# Patient Record
Sex: FEMALE | URBAN - METROPOLITAN AREA
[De-identification: names, ages, dates, MRNs, and addresses within clinical notes are randomized per-mention and may not be internally consistent; named-entity substitution may affect disease eponyms.]

---

## 2020-08-20 ENCOUNTER — TELEPHONE (OUTPATIENT)
Dept: OBSTETRICS AND GYNECOLOGY | Facility: CLINIC | Age: 40
End: 2020-08-20

## 2020-08-20 ENCOUNTER — OFFICE VISIT (OUTPATIENT)
Dept: OBSTETRICS AND GYNECOLOGY | Facility: CLINIC | Age: 40
End: 2020-08-20
Payer: MEDICAID

## 2020-08-20 VITALS
SYSTOLIC BLOOD PRESSURE: 120 MMHG | DIASTOLIC BLOOD PRESSURE: 92 MMHG | BODY MASS INDEX: 35.29 KG/M2 | WEIGHT: 224.88 LBS | HEIGHT: 67 IN

## 2020-08-20 DIAGNOSIS — Z01.419 WELL WOMAN EXAM WITH ROUTINE GYNECOLOGICAL EXAM: Primary | ICD-10-CM

## 2020-08-20 DIAGNOSIS — B35.4 TINEA CORPORIS: ICD-10-CM

## 2020-08-20 DIAGNOSIS — N89.8 VAGINAL DISCHARGE: ICD-10-CM

## 2020-08-20 PROCEDURE — 99385 PR PREVENTIVE VISIT,NEW,18-39: ICD-10-PCS | Mod: S$PBB,,, | Performed by: OBSTETRICS & GYNECOLOGY

## 2020-08-20 PROCEDURE — 99385 PREV VISIT NEW AGE 18-39: CPT | Mod: S$PBB,,, | Performed by: OBSTETRICS & GYNECOLOGY

## 2020-08-20 PROCEDURE — 88141 CYTOPATH C/V INTERPRET: CPT | Mod: ,,, | Performed by: PATHOLOGY

## 2020-08-20 PROCEDURE — 88141 PR  CYTOPATH CERV/VAG INTERPRET: ICD-10-PCS | Mod: ,,, | Performed by: PATHOLOGY

## 2020-08-20 PROCEDURE — 87480 CANDIDA DNA DIR PROBE: CPT

## 2020-08-20 PROCEDURE — 88142 CYTOPATH C/V THIN LAYER: CPT | Performed by: PATHOLOGY

## 2020-08-20 PROCEDURE — 87491 CHLMYD TRACH DNA AMP PROBE: CPT

## 2020-08-20 PROCEDURE — 99999 PR PBB SHADOW E&M-NEW PATIENT-LVL III: ICD-10-PCS | Mod: PBBFAC,,, | Performed by: OBSTETRICS & GYNECOLOGY

## 2020-08-20 PROCEDURE — 99999 PR PBB SHADOW E&M-NEW PATIENT-LVL III: CPT | Mod: PBBFAC,,, | Performed by: OBSTETRICS & GYNECOLOGY

## 2020-08-20 PROCEDURE — 99203 OFFICE O/P NEW LOW 30 MIN: CPT | Mod: PBBFAC | Performed by: OBSTETRICS & GYNECOLOGY

## 2020-08-20 PROCEDURE — 87510 GARDNER VAG DNA DIR PROBE: CPT

## 2020-08-20 PROCEDURE — 87624 HPV HI-RISK TYP POOLED RSLT: CPT

## 2020-08-20 RX ORDER — CETIRIZINE HYDROCHLORIDE 10 MG/1
TABLET ORAL
COMMUNITY
Start: 2020-07-27

## 2020-08-20 RX ORDER — NYSTATIN AND TRIAMCINOLONE ACETONIDE 100000; 1 [USP'U]/G; MG/G
CREAM TOPICAL
Qty: 30 G | Refills: 1 | Status: SHIPPED | OUTPATIENT
Start: 2020-08-20 | End: 2021-08-20

## 2020-08-20 RX ORDER — TRIAMCINOLONE ACETONIDE 0.25 MG/G
OINTMENT TOPICAL
COMMUNITY
Start: 2020-07-27 | End: 2021-07-27

## 2020-08-20 NOTE — PATIENT INSTRUCTIONS
Prevention Guidelines, Women Ages 18 to 39  Screening tests and vaccines are an important part of managing your health. Health counseling is essential, too. Below are guidelines for these, for women ages 18 to 39. Talk with your healthcare provider to make sure youre up-to-date on what you need.  Screening Who needs it How often   Alcohol misuse All women in this age group At routine exams   Blood pressure All women in this age group Every 2 years if your blood pressure is less than 120/80 mm Hg; yearly if your systolic blood pressure is 120 to 139 mm Hg, or your diastolic blood pressure reading is 80 to 89 mm Hg   Breast cancer All women in this age group should talk with their healthcare providers about the need for clinical breast exams (CBE)1 Clinical breast exam every 3 years1   Cervical cancer Women ages 21 and older Women between ages 21 and 29 should have a Pap test every 3 years; women between ages 30 and 65 are advised to have a Pap test plus an HPV test every 5 years   Chlamydia Sexually active women ages 24 and younger, and women at increased risk for infection Every 3 years if you're at risk or have symptoms   Depression All women in this age group At routine exams   Diabetes mellitus, type 2 Adults with no symptoms who are overweight or obese and have 1 or more other risk factors for diabetes At least every 3 years   Gonorrhea Sexually active women at increased risk for infection At routine exams   Hepatitis C Anyone at increased risk At routine exams   HIV All women At routine exams   Obesity All women in this age group At routine exams   Syphilis Women at increased risk for infection - talk with your healthcare provider At routine exams   Tuberculosis Women at increased risk for infection - talk with your healthcare provider Ask your healthcare provider   Vision All women in this age group At least 1 complete exam in your 20s, and 2 in your 30s   Vaccine2 Who needs it How often   Chickenpox  (varicella) All women in this age group who have no record of this infection or vaccine 2 doses; the second dose should be given 4 to 8 weeks after the first dose   Hepatitis A Women at increased risk for infection - talk with your healthcare provider 2 doses given at least 6 months apart   Hepatitis B Women at increased risk for infection - talk with your healthcare provider 3 doses over 6 months; second dose should be given 1 month after the first dose; the third dose should be given at least 2 months after the second dose and at least 4 months after the first dose   Haemophilus influenzae Type B (HIB) Women at increased risk for infection - talk with your healthcare provider 1 to 3 doses   Human papillomavirus (HPV) All women in this age group up to age 26 3 doses; the second dose should be given 1 to 2 months after the first dose and the third dose given 6 months after the first dose   Influenza (flu) All women in this age group Once a year   Measles, mumps, rubella (MMR) All women in this age group who have no record of these infections or vaccines 1 or 2 doses   Meningococcal Women at increased risk for infection - talk with your healthcare provider 1 or more doses   Pneumococcal conjugate vaccine (PCV13) and pneumococcal polysaccharide vaccine (PPSV23) Women at increased risk for infection - talk with your healthcare provider PCV13: 1 dose ages 19 to 65 (protects against 13 types of pneumococcal bacteria)  PPSV23: 1 to 2 doses through age 64, or 1 dose at 65 or older (protects against 23 types of pneumococcal bacteria)      Tetanus/diphtheria/pertussis (Td/Tdap) booster All women in this age group Td every 10 years, or a one-time dose of Tdap instead of a Td booster after age 18, then Td every 10 years   Counseling Who needs it How often   BRCA gene mutation testing for breast and ovarian cancer susceptibility Women with increased risk for having gene mutation When your risk is known   Breast cancer and  chemoprevention Women at high risk for breast cancer When your risk is known   Diet and exercise Women who are overweight or obese When diagnosed, and then at routine exams   Domestic violence Women at the age in which they are able to have children At routine exams   Sexually transmitted infection prevention Women who are sexually active At routine exams   Skin cancer Prevention of skin cancer in fair-skinned adults through age 24 At routine exams   Use of tobacco and the health effects it can cause All women in this age group Every visit   1According to the ACS, women ages 20 to 39 years should have a clinical breast exam (CBE) as part of their routine health exam every 3 years. Breast self-exams are an option for women starting in their 20s. But the  USPSTF does not recommend CBE.  2Those who are 18 years old and not up-to-date on their childhood vaccines should get all appropriate catch-up vaccines recommended by the CDC.  Date Last Reviewed: 8/27/2015  © 4011-4977 [employer]. [address] [place] [address], [place] [address]. All rights reserved. This information is not intended as a substitute for professional medical care. Always follow your healthcare professional's instructions.
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
## 2020-08-20 NOTE — TELEPHONE ENCOUNTER
----- Message from Jesi Cosme sent at 8/20/2020 10:42 AM CDT -----  Regarding: medication  Type: Patient Call Back    Who called:Self    What is the request in detail:Patient called regarding cream for ring worm, she wants to know if prescription was submitted and if so where was it sent.    Can the clinic reply by [facility]?no    Would the patient rather a call back or a response via [facility]? Callback    Best call back number:[phone]

## 2020-08-20 NOTE — TELEPHONE ENCOUNTER
Spoke with patient and explained to patient the medication Dr. Ferrer prescribed for her ringworm has been sent to the pharmacy. Patient denies any additional questions at this time.

## 2020-08-22 LAB
CANDIDA RRNA VAG QL PROBE: NOT DETECTED
G VAGINALIS RRNA GENITAL QL PROBE: DETECTED
T VAGINALIS RRNA GENITAL QL PROBE: NOT DETECTED

## 2020-08-23 DIAGNOSIS — B96.89 BV (BACTERIAL VAGINOSIS): Primary | ICD-10-CM

## 2020-08-23 DIAGNOSIS — N76.0 BV (BACTERIAL VAGINOSIS): Primary | ICD-10-CM

## 2020-08-23 RX ORDER — METRONIDAZOLE 500 MG/1
500 TABLET ORAL EVERY 12 HOURS
Qty: 14 TABLET | Refills: 0 | Status: SHIPPED | OUTPATIENT
Start: 2020-08-23 | End: 2020-08-30

## 2020-08-27 LAB
HPV HR 12 DNA SPEC QL NAA+PROBE: NEGATIVE
HPV16 AG SPEC QL: NEGATIVE
HPV18 DNA SPEC QL NAA+PROBE: NEGATIVE

## 2020-09-08 LAB
FINAL PATHOLOGIC DIAGNOSIS: ABNORMAL
Lab: ABNORMAL

## 2020-09-12 LAB
C TRACH DNA SPEC QL NAA+PROBE: NOT DETECTED
N GONORRHOEA DNA SPEC QL NAA+PROBE: NOT DETECTED

## 2021-04-12 ENCOUNTER — PATIENT MESSAGE (OUTPATIENT)
Dept: RESEARCH | Facility: HOSPITAL | Age: 41
End: 2021-04-12

## 2022-04-10 ENCOUNTER — HOSPITAL ENCOUNTER (EMERGENCY)
Facility: HOSPITAL | Age: 42
Discharge: HOME | End: 2022-04-10
Attending: INTERNAL MEDICINE
Payer: MEDICAID

## 2022-04-10 VITALS
WEIGHT: 224 LBS | BODY MASS INDEX: 35.08 KG/M2 | DIASTOLIC BLOOD PRESSURE: 67 MMHG | TEMPERATURE: 99 F | RESPIRATION RATE: 18 BRPM | OXYGEN SATURATION: 98 % | SYSTOLIC BLOOD PRESSURE: 116 MMHG | HEART RATE: 73 BPM

## 2022-04-10 DIAGNOSIS — R25.2 MUSCLE CRAMPS: ICD-10-CM

## 2022-04-10 DIAGNOSIS — E87.6 HYPOKALEMIA: Primary | ICD-10-CM

## 2022-04-10 LAB
ALBUMIN SERPL BCP-MCNC: 3.5 G/DL (ref 3.5–5.2)
ALP SERPL-CCNC: 52 U/L (ref 55–135)
ALT SERPL W/O P-5'-P-CCNC: 14 U/L (ref 10–44)
AMPHET+METHAMPHET UR QL: NEGATIVE
ANION GAP SERPL CALC-SCNC: 9 MMOL/L (ref 8–16)
AST SERPL-CCNC: 19 U/L (ref 10–40)
BARBITURATES UR QL SCN>200 NG/ML: NEGATIVE
BASOPHILS # BLD AUTO: 0.07 K/UL (ref 0–0.2)
BASOPHILS NFR BLD: 0.7 % (ref 0–1.9)
BENZODIAZ UR QL SCN>200 NG/ML: NEGATIVE
BILIRUB SERPL-MCNC: 0.7 MG/DL (ref 0.1–1)
BUN SERPL-MCNC: 13 MG/DL (ref 6–20)
BZE UR QL SCN: NEGATIVE
CALCIUM SERPL-MCNC: 8.4 MG/DL (ref 8.7–10.5)
CANNABINOIDS UR QL SCN: NEGATIVE
CHLORIDE SERPL-SCNC: 109 MMOL/L (ref 95–110)
CO2 SERPL-SCNC: 23 MMOL/L (ref 23–29)
CREAT SERPL-MCNC: 0.8 MG/DL (ref 0.5–1.4)
CREAT UR-MCNC: 83.6 MG/DL (ref 15–325)
DIFFERENTIAL METHOD: ABNORMAL
EOSINOPHIL # BLD AUTO: 0.3 K/UL (ref 0–0.5)
EOSINOPHIL NFR BLD: 3.2 % (ref 0–8)
ERYTHROCYTE [DISTWIDTH] IN BLOOD BY AUTOMATED COUNT: 12.6 % (ref 11.5–14.5)
EST. GFR  (AFRICAN AMERICAN): >60 ML/MIN/1.73 M^2
EST. GFR  (NON AFRICAN AMERICAN): >60 ML/MIN/1.73 M^2
GLUCOSE SERPL-MCNC: 110 MG/DL (ref 70–110)
HCT VFR BLD AUTO: 35.7 % (ref 37–48.5)
HGB BLD-MCNC: 11.3 G/DL (ref 12–16)
IMM GRANULOCYTES # BLD AUTO: 0.02 K/UL (ref 0–0.04)
IMM GRANULOCYTES NFR BLD AUTO: 0.2 % (ref 0–0.5)
LYMPHOCYTES # BLD AUTO: 3.6 K/UL (ref 1–4.8)
LYMPHOCYTES NFR BLD: 35.3 % (ref 18–48)
MCH RBC QN AUTO: 29.3 PG (ref 27–31)
MCHC RBC AUTO-ENTMCNC: 31.7 G/DL (ref 32–36)
MCV RBC AUTO: 93 FL (ref 82–98)
METHADONE UR QL SCN>300 NG/ML: NEGATIVE
MONOCYTES # BLD AUTO: 0.6 K/UL (ref 0.3–1)
MONOCYTES NFR BLD: 5.9 % (ref 4–15)
NEUTROPHILS # BLD AUTO: 5.6 K/UL (ref 1.8–7.7)
NEUTROPHILS NFR BLD: 54.7 % (ref 38–73)
NRBC BLD-RTO: 0 /100 WBC
OPIATES UR QL SCN: NEGATIVE
PCP UR QL SCN>25 NG/ML: NEGATIVE
PLATELET # BLD AUTO: 304 K/UL (ref 150–450)
PMV BLD AUTO: 9.3 FL (ref 9.2–12.9)
POTASSIUM SERPL-SCNC: 2.9 MMOL/L (ref 3.5–5.1)
PROT SERPL-MCNC: 6.5 G/DL (ref 6–8.4)
RBC # BLD AUTO: 3.86 M/UL (ref 4–5.4)
SODIUM SERPL-SCNC: 141 MMOL/L (ref 136–145)
TOXICOLOGY INFORMATION: NORMAL
WBC # BLD AUTO: 10.15 K/UL (ref 3.9–12.7)

## 2022-04-10 PROCEDURE — 80053 COMPREHEN METABOLIC PANEL: CPT | Performed by: INTERNAL MEDICINE

## 2022-04-10 PROCEDURE — 99284 EMERGENCY DEPT VISIT MOD MDM: CPT | Mod: 25

## 2022-04-10 PROCEDURE — 96361 HYDRATE IV INFUSION ADD-ON: CPT

## 2022-04-10 PROCEDURE — 80307 DRUG TEST PRSMV CHEM ANLYZR: CPT | Performed by: INTERNAL MEDICINE

## 2022-04-10 PROCEDURE — 25000003 PHARM REV CODE 250: Performed by: INTERNAL MEDICINE

## 2022-04-10 PROCEDURE — 85025 COMPLETE CBC W/AUTO DIFF WBC: CPT | Performed by: INTERNAL MEDICINE

## 2022-04-10 PROCEDURE — 96360 HYDRATION IV INFUSION INIT: CPT

## 2022-04-10 RX ORDER — SODIUM CHLORIDE 9 MG/ML
1000 INJECTION, SOLUTION INTRAVENOUS ONCE
Status: COMPLETED | OUTPATIENT
Start: 2022-04-10 | End: 2022-04-10

## 2022-04-10 RX ORDER — POTASSIUM CHLORIDE 20 MEQ/1
60 TABLET, EXTENDED RELEASE ORAL
Status: COMPLETED | OUTPATIENT
Start: 2022-04-10 | End: 2022-04-10

## 2022-04-10 RX ORDER — SODIUM CHLORIDE 9 MG/ML
1000 INJECTION, SOLUTION INTRAVENOUS ONCE
Status: DISCONTINUED | OUTPATIENT
Start: 2022-04-10 | End: 2022-04-10

## 2022-04-10 RX ADMIN — SODIUM CHLORIDE 1000 ML: 0.9 INJECTION, SOLUTION INTRAVENOUS at 03:04

## 2022-04-10 RX ADMIN — POTASSIUM CHLORIDE 60 MEQ: 20 TABLET, EXTENDED RELEASE ORAL at 02:04

## 2022-04-10 RX ADMIN — SODIUM CHLORIDE 1000 ML: 0.9 INJECTION, SOLUTION INTRAVENOUS at 02:04

## 2022-04-10 NOTE — ED PROVIDER NOTES
Encounter Date: 4/10/2022    SCRIBE #1 NOTE: I, Alex Kerr, am scribing for, and in the presence of,  Reji Shea MD. I have scribed the following portions of the note - Other sections scribed: HPI, ROS, PE.       History     Chief Complaint   Patient presents with    Seizures     Possible seizure according to family. No hx of seizure, not postictal. EMS reports bizzarre behavior on scene. Follows commands. Pt oriented x4, but will only intermittently respond and intermittently follow commands. Pt often stares at the wall and does not respond. VSS. NAD noted.      Flakita Douglas is a 41 y. o female with no pertinent PMHx, that comes to the ED complaining of possible seizure beginning today. Per patient's , she went to  his daughter when she said that she needed to go to the hospital, endorsed nausea, and subsequently stopped talking. EMS personnel reports bizarre behavior on scene. Per patient's , patient didn't drink any water today and endorses having one energy drink. No medications taken PTA. No alleviating or exacerbating factors noted. Denies CP. Allergic to penicillin.  Patient states she feels as though all her muscles are  tight, including her facial muscles.    The history is provided by the patient, the spouse and the EMS personnel. No  was used.     Review of patient's allergies indicates:   Allergen Reactions    Pcn [penicillins]      History reviewed. No pertinent past medical history.  History reviewed. No pertinent surgical history.  History reviewed. No pertinent family history.  Social History     Tobacco Use    Smoking status: Never Smoker   Substance Use Topics    Alcohol use: Yes     Alcohol/week: 0.0 standard drinks    Drug use: Never     Review of Systems   Constitutional: Negative for fever.   HENT: Negative for congestion.    Eyes: Negative for visual disturbance.   Respiratory: Negative for cough.    Cardiovascular: Negative for  chest pain.   Gastrointestinal: Positive for nausea.   Genitourinary: Negative for dysuria.   Musculoskeletal: Positive for myalgias. Negative for back pain.   Skin: Negative for rash.   Neurological: Negative for weakness.   All other systems reviewed and are negative.      Physical Exam     Initial Vitals [04/10/22 0004]   BP Pulse Resp Temp SpO2   (!) 160/90 84 16 98.4 °F (36.9 °C) 100 %      MAP       --         Physical Exam    Nursing note and vitals reviewed.  Constitutional: She appears well-developed and well-nourished.   HENT:   Head: Normocephalic and atraumatic.   Right Ear: External ear normal.   Left Ear: External ear normal.   Mouth/Throat: Oropharynx is clear and moist.   Eyes: Conjunctivae and EOM are normal. Pupils are equal, round, and reactive to light.   Neck: Neck supple.   Normal range of motion.  Cardiovascular: Normal rate and regular rhythm.   Pulmonary/Chest: Breath sounds normal. No respiratory distress. She has no wheezes. She has no rales.   Abdominal: Abdomen is soft. Bowel sounds are normal. There is no abdominal tenderness.   Musculoskeletal:         General: No tenderness or edema.      Cervical back: Normal range of motion and neck supple.      Comments: Patient is able move all extremities, but states all movement is painful secondary to generalized muscle cramps.     Neurological: She is alert and oriented to person, place, and time. She has normal strength.   Skin: Skin is warm. Capillary refill takes less than 2 seconds.   Psychiatric: She has a normal mood and affect. Thought content normal.         ED Course   Procedures  Labs Reviewed   CBC W/ AUTO DIFFERENTIAL - Abnormal; Notable for the following components:       Result Value    RBC 3.86 (*)     Hemoglobin 11.3 (*)     Hematocrit 35.7 (*)     MCHC 31.7 (*)     All other components within normal limits   COMPREHENSIVE METABOLIC PANEL - Abnormal; Notable for the following components:    Potassium 2.9 (*)     Calcium 8.4 (*)      Alkaline Phosphatase 52 (*)     All other components within normal limits   DRUG SCREEN PANEL, URINE EMERGENCY    Narrative:     Specimen Source->Urine          Imaging Results          CT Head Without Contrast (Final result)  Result time 04/10/22 00:45:21    Final result by Jose Paz MD (04/10/22 00:45:21)                 Impression:      No acute intracranial abnormalities identified.      Electronically signed by: Jose Paz MD  Date:    04/10/2022  Time:    00:45             Narrative:    EXAMINATION:  CT HEAD WITHOUT CONTRAST    CLINICAL HISTORY:  Seizure, new-onset, no history of trauma;    TECHNIQUE:  Low dose axial images were obtained through the head.  Coronal and sagittal reformations were also performed. Contrast was not administered.    COMPARISON:  None.    FINDINGS:  No evidence of acute/recent major vascular distribution cerebral infarction, intraparenchymal hemorrhage, or intra-axial space occupying lesion. The ventricular system is normal in size and configuration with no evidence of hydrocephalus. No effacement of the skull-base cisterns. No abnormal extra-axial fluid collections or blood products. Visualized paranasal sinuses and mastoid air cells are clear. The calvarium shows no significant abnormality.                                 Medications   0.9%  NaCl infusion (has no administration in time range)   potassium chloride SA CR tablet 60 mEq (60 mEq Oral Given 4/10/22 0217)   0.9%  NaCl infusion (1,000 mLs Intravenous New Bag 4/10/22 0217)     Medical Decision Making:   History:   Old Medical Records: I decided to obtain old medical records.  Initial Assessment:   Flakita Douglas is a 41 y. o female with no pertinent PMHx, that comes to the ED complaining of possible seizure beginning today. Per patient's , she went to  his daughter when she said that she needed to go to the hospital, endorsed nausea, and subsequently stopped talking. EMS personnel reports  "bizarre behavior on scene. Per patient's , patient didn't drink any water today and endorses having one energy drink. No medications taken PTA. No alleviating or exacerbating factors noted. Denies CP. Allergic to penicillin.  Patient states she feels as though all her muscles are " tight", including her facial muscles.    Clinical Tests:   Lab Tests: Ordered and Reviewed  Radiological Study: Ordered and Reviewed  ED Management:  CBC/CMP revealed slight anemia as well as hypokalemia (potassium was 2.9).  IV fluids and supplemental potassium were given.  Patient states symptoms are completely resolved after normal saline bolus and potassium.  She was able to ambulate without difficulty prior to discharge.  Instructions for hypokalemia were given and patient was advised to follow-up with her primary care physician within the next 3 days for re-evaluation/return to the emergency department if condition worsens.          Scribe Attestation:   Scribe #1: I performed the above scribed service and the documentation accurately describes the services I performed. I attest to the accuracy of the note.                 Clinical Impression:   Final diagnoses:  [E87.6] Hypokalemia (Primary)  [R25.2] Muscle cramps       This document was produced by a scribe under my direction and in my presence. I agree with the content of the note and have made any necessary edits.     Dr. Shea    04/10/2022 3:58 AM     ED Disposition Condition    Discharge Stable        ED Prescriptions     None        Follow-up Information    None          Reji Shea MD  04/10/22 6050    "

## 2024-08-09 ENCOUNTER — TELEPHONE (OUTPATIENT)
Dept: OBSTETRICS AND GYNECOLOGY | Facility: CLINIC | Age: 44
End: 2024-08-09
Payer: MEDICAID

## 2024-08-21 ENCOUNTER — TELEPHONE (OUTPATIENT)
Dept: OBSTETRICS AND GYNECOLOGY | Facility: CLINIC | Age: 44
End: 2024-08-21
Payer: COMMERCIAL

## 2024-08-21 NOTE — TELEPHONE ENCOUNTER
Pt scheduled.  ----- Message from Dinora Asif sent at 8/21/2024  3:57 PM CDT -----  .Type: Patient Call Back    Who called: Self     What is the request in detail: Need to reschedule appointments for an earlier date and time. Ask that the nurse give nayan a call     Can the clinic reply by [facility]? No     Would the patient rather a call back or a response via [facility]? Call Back     Best call back number: .[phone] (home) [phone] (work)      Additional Information:

## 2024-09-10 ENCOUNTER — OFFICE VISIT (OUTPATIENT)
Dept: OBSTETRICS AND GYNECOLOGY | Facility: CLINIC | Age: 44
End: 2024-09-10
Payer: COMMERCIAL

## 2024-09-10 VITALS — DIASTOLIC BLOOD PRESSURE: 70 MMHG | BODY MASS INDEX: 31.17 KG/M2 | WEIGHT: 199 LBS | SYSTOLIC BLOOD PRESSURE: 110 MMHG

## 2024-09-10 DIAGNOSIS — Z30.432 ENCOUNTER FOR IUD REMOVAL: ICD-10-CM

## 2024-09-10 DIAGNOSIS — Z12.31 BREAST CANCER SCREENING BY MAMMOGRAM: Primary | ICD-10-CM

## 2024-09-10 DIAGNOSIS — Z30.09 UNWANTED FERTILITY: ICD-10-CM

## 2024-09-10 PROCEDURE — 3008F BODY MASS INDEX DOCD: CPT | Mod: CPTII,S$GLB,, | Performed by: OBSTETRICS & GYNECOLOGY

## 2024-09-10 PROCEDURE — 1159F MED LIST DOCD IN RCRD: CPT | Mod: CPTII,S$GLB,, | Performed by: OBSTETRICS & GYNECOLOGY

## 2024-09-10 PROCEDURE — 99999 PR PBB SHADOW E&M-EST. PATIENT-LVL IV: CPT | Mod: PBBFAC,,, | Performed by: OBSTETRICS & GYNECOLOGY

## 2024-09-10 PROCEDURE — 3078F DIAST BP <80 MM HG: CPT | Mod: CPTII,S$GLB,, | Performed by: OBSTETRICS & GYNECOLOGY

## 2024-09-10 PROCEDURE — 3074F SYST BP LT 130 MM HG: CPT | Mod: CPTII,S$GLB,, | Performed by: OBSTETRICS & GYNECOLOGY

## 2024-09-10 PROCEDURE — 1160F RVW MEDS BY RX/DR IN RCRD: CPT | Mod: CPTII,S$GLB,, | Performed by: OBSTETRICS & GYNECOLOGY

## 2024-09-10 PROCEDURE — 99214 OFFICE O/P EST MOD 30 MIN: CPT | Mod: S$GLB,,, | Performed by: OBSTETRICS & GYNECOLOGY

## 2024-09-10 NOTE — PROGRESS NOTES
History & Physical  Gynecology Problem Visit      SUBJECTIVE:     Chief Complaint: Gynecologic Exam       History of Present Illness:  Ms. Douglas is a 43 y/o female who presents for annual exam. She reports that she has IUD in place that she would like to have her IUD removed. Patient reports that she would like to have a tubal ligation. She reports that she would like to wait until she has salpingectomy done before she removes the IUD.      Review of patient's allergies indicates:   Allergen Reactions    Pcn [penicillins]        History reviewed. No pertinent past medical history.  History reviewed. No pertinent surgical history.  OB History          2    Para   2    Term   2            AB        Living   2         SAB        IAB        Ectopic        Multiple        Live Births                   Family History   Problem Relation Name Age of Onset    Breast cancer Maternal Aunt      Breast cancer Maternal Grandmother       Social History     Tobacco Use    Smoking status: Never   Substance Use Topics    Alcohol use: Yes     Alcohol/week: 0.0 standard drinks of alcohol    Drug use: Never       Current Outpatient Medications   Medication Sig    cetirizine (ZYRTEC) 10 MG tablet TK 1 T PO D    multivitamin capsule Take 1 capsule by mouth.    nystatin-triamcinolone (MYCOLOG II) cream Apply to affected area 2 times daily    triamcinolone acetonide 0.025% (KENALOG) 0.025 % Oint Apply to the affected area 1-2 times daily for up to 2 weeks.     No current facility-administered medications for this visit.     Review of Systems:  Review of Systems   Constitutional:  Negative for chills and fatigue.   Respiratory:  Negative for chest tightness and shortness of breath.    Cardiovascular:  Negative for chest pain.   Gastrointestinal:  Negative for abdominal pain, nausea and vomiting.   Genitourinary:  Negative for frequency, menstrual problem, pelvic pain, vaginal bleeding and vaginal discharge.   Musculoskeletal:   Negative for back pain.   Neurological:  Negative for dizziness and light-headedness.   Psychiatric/Behavioral:  Negative for confusion and sleep disturbance. The patient is not nervous/anxious.         OBJECTIVE:   Vitals:     Vitals:    09/10/24 0853   BP: 110/70   Weight: 90.3 kg (199 lb)        Physical Exam:  Physical Exam  Vitals and nursing note reviewed.   Constitutional:       Appearance: Normal appearance. She is well-developed.   Cardiovascular:      Rate and Rhythm: Normal rate.   Pulmonary:      Effort: Pulmonary effort is normal. No respiratory distress.   Abdominal:      General: There is no distension.      Palpations: Abdomen is soft.      Tenderness: There is no abdominal tenderness.   Genitourinary:     Exam position: Supine.   Skin:     General: Skin is warm and dry.   Neurological:      Mental Status: She is oriented to person, place, and time.       ASSESSMENT:       ICD-10-CM ICD-9-CM    1. Breast cancer screening by mammogram  Z12.31 V76.12 Mammo Digital Screening Bilat w/ Lucio      2. Encounter for IUD removal  Z30.432 V25.12       3. Unwanted fertility  Z30.09 V25.09 Case Request Operating Room: SALPINGECTOMY, LAPAROSCOPIC         Plan:      Flakita was seen today for gynecologic exam.    Diagnoses and all orders for this visit:    Breast cancer screening by mammogram  -     Mammo Digital Screening Bilat w/ Lucio; Future    Encounter for IUD removal  - Will remove IUD    Unwanted fertility  -     Case Request Operating Room: SALPINGECTOMY, LAPAROSCOPIC  - BTL consents signed after multiple visits during which patient was extensively counseled on other methods birth control including Nexplanon, OCPs, ParaGard, Mirena, Depo Provera, Ortho Erva, Nuva Ring, and male vastectomy and all of which she declined. She is adamant that she will not have any regret no matter the scenario in 5-10 years and she would like to proceed with permanent sterilization. She understands that this is procedure is not  reversible and she will no longer be able to conceive naturally in the future.         Orders Placed This Encounter   Procedures    Mammo Digital Screening Bilat w/ Lucio       Follow up in about 6 weeks (around 10/22/2024) for Postop F/U.

## 2024-09-19 ENCOUNTER — ANESTHESIA EVENT (OUTPATIENT)
Dept: SURGERY | Facility: HOSPITAL | Age: 44
End: 2024-09-19
Payer: COMMERCIAL

## 2024-09-20 ENCOUNTER — HOSPITAL ENCOUNTER (OUTPATIENT)
Dept: RADIOLOGY | Facility: HOSPITAL | Age: 44
Discharge: HOME | End: 2024-09-20
Attending: OBSTETRICS & GYNECOLOGY
Payer: COMMERCIAL

## 2024-09-20 DIAGNOSIS — Z12.31 BREAST CANCER SCREENING BY MAMMOGRAM: ICD-10-CM

## 2024-09-20 PROCEDURE — 77063 BREAST TOMOSYNTHESIS BI: CPT | Mod: 26,,, | Performed by: RADIOLOGY

## 2024-09-20 PROCEDURE — 77067 SCR MAMMO BI INCL CAD: CPT | Mod: TC

## 2024-09-20 PROCEDURE — 77067 SCR MAMMO BI INCL CAD: CPT | Mod: 26,,, | Performed by: RADIOLOGY

## 2024-09-20 PROCEDURE — 77063 BREAST TOMOSYNTHESIS BI: CPT | Mod: TC

## 2024-09-25 ENCOUNTER — HOSPITAL ENCOUNTER (OUTPATIENT)
Dept: PREADMISSION TESTING | Facility: HOSPITAL | Age: 44
Discharge: HOME | End: 2024-09-25
Attending: OBSTETRICS & GYNECOLOGY
Payer: COMMERCIAL

## 2024-09-25 VITALS
HEART RATE: 69 BPM | OXYGEN SATURATION: 100 % | TEMPERATURE: 98 F | HEIGHT: 66 IN | RESPIRATION RATE: 18 BRPM | BODY MASS INDEX: 31.82 KG/M2 | WEIGHT: 198 LBS | SYSTOLIC BLOOD PRESSURE: 109 MMHG | DIASTOLIC BLOOD PRESSURE: 68 MMHG

## 2024-09-25 DIAGNOSIS — Z01.818 PREOPERATIVE TESTING: Primary | ICD-10-CM

## 2024-09-25 LAB
ALBUMIN SERPL BCP-MCNC: 3.7 G/DL (ref 3.5–5.2)
ALP SERPL-CCNC: 41 U/L (ref 55–135)
ALT SERPL W/O P-5'-P-CCNC: 13 U/L (ref 10–44)
ANION GAP SERPL CALC-SCNC: 7 MMOL/L (ref 8–16)
AST SERPL-CCNC: 17 U/L (ref 10–40)
BASOPHILS # BLD AUTO: 0.07 K/UL (ref 0–0.2)
BASOPHILS NFR BLD: 0.9 % (ref 0–1.9)
BILIRUB SERPL-MCNC: 1.1 MG/DL (ref 0.1–1)
BUN SERPL-MCNC: 14 MG/DL (ref 6–20)
CALCIUM SERPL-MCNC: 9.4 MG/DL (ref 8.7–10.5)
CHLORIDE SERPL-SCNC: 109 MMOL/L (ref 95–110)
CO2 SERPL-SCNC: 25 MMOL/L (ref 23–29)
CREAT SERPL-MCNC: 0.8 MG/DL (ref 0.5–1.4)
DIFFERENTIAL METHOD BLD: NORMAL
EOSINOPHIL # BLD AUTO: 0.3 K/UL (ref 0–0.5)
EOSINOPHIL NFR BLD: 3.3 % (ref 0–8)
ERYTHROCYTE [DISTWIDTH] IN BLOOD BY AUTOMATED COUNT: 12.6 % (ref 11.5–14.5)
EST. GFR  (NO RACE VARIABLE): >60 ML/MIN/1.73 M^2
GLUCOSE SERPL-MCNC: 78 MG/DL (ref 70–110)
HCT VFR BLD AUTO: 39.1 % (ref 37–48.5)
HGB BLD-MCNC: 12.6 G/DL (ref 12–16)
IMM GRANULOCYTES # BLD AUTO: 0.02 K/UL (ref 0–0.04)
IMM GRANULOCYTES NFR BLD AUTO: 0.3 % (ref 0–0.5)
LYMPHOCYTES # BLD AUTO: 2.3 K/UL (ref 1–4.8)
LYMPHOCYTES NFR BLD: 28.9 % (ref 18–48)
MCH RBC QN AUTO: 30.4 PG (ref 27–31)
MCHC RBC AUTO-ENTMCNC: 32.2 G/DL (ref 32–36)
MCV RBC AUTO: 94 FL (ref 82–98)
MONOCYTES # BLD AUTO: 0.5 K/UL (ref 0.3–1)
MONOCYTES NFR BLD: 6.3 % (ref 4–15)
NEUTROPHILS # BLD AUTO: 4.8 K/UL (ref 1.8–7.7)
NEUTROPHILS NFR BLD: 60.3 % (ref 38–73)
NRBC BLD-RTO: 0 /100 WBC
PLATELET # BLD AUTO: 329 K/UL (ref 150–450)
PMV BLD AUTO: 9.9 FL (ref 9.2–12.9)
POTASSIUM SERPL-SCNC: 4.4 MMOL/L (ref 3.5–5.1)
PROT SERPL-MCNC: 7 G/DL (ref 6–8.4)
RBC # BLD AUTO: 4.14 M/UL (ref 4–5.4)
SODIUM SERPL-SCNC: 141 MMOL/L (ref 136–145)
WBC # BLD AUTO: 7.98 K/UL (ref 3.9–12.7)

## 2024-09-25 PROCEDURE — 36415 COLL VENOUS BLD VENIPUNCTURE: CPT | Performed by: OBSTETRICS & GYNECOLOGY

## 2024-09-25 PROCEDURE — 80053 COMPREHEN METABOLIC PANEL: CPT | Performed by: OBSTETRICS & GYNECOLOGY

## 2024-09-25 PROCEDURE — 85025 COMPLETE CBC W/AUTO DIFF WBC: CPT | Performed by: OBSTETRICS & GYNECOLOGY

## 2024-09-25 RX ORDER — ASPIRIN 81 MG/1
1 TABLET ORAL DAILY
COMMUNITY

## 2024-09-25 NOTE — ANESTHESIA PREPROCEDURE EVALUATION
09/25/2024  Flakita Douglas is a 44 y.o., female scheduled for SALPINGECTOMY, LAPAROSCOPIC (Bilateral: Abdomen) on 10/3/2024.        Past Medical History:   Diagnosis Date    Obesity, unspecified     TIA (transient ischemic attack)            No past surgical history on file.    Pre-operative evaluation for Procedure(s) (LRB):  SALPINGECTOMY, LAPAROSCOPIC (Bilateral)  REMOVAL, INTRAUTERINE DEVICE (N/A)    Flakita Douglas is a 44 y.o. female     Patient Active Problem List   Diagnosis    Status post bilateral salpingectomy    Altered mental status       Review of patient's allergies indicates:   Allergen Reactions    Pcn [penicillins]        No current facility-administered medications on file prior to encounter.     No current outpatient medications on file prior to encounter.       History reviewed. No pertinent surgical history.    Social History     Socioeconomic History    Marital status: Unknown   Tobacco Use    Smoking status: Never   Substance and Sexual Activity    Alcohol use: Yes     Alcohol/week: 0.0 standard drinks of alcohol    Drug use: Never    Sexual activity: Yes     Social Drivers of Health     Financial Resource Strain: Low Risk  (4/12/2022)    Received from [facility]    Overall Financial Resource Strain (CARDIA)     Difficulty of Paying Living Expenses: Not hard at all   Food Insecurity: No Food Insecurity (4/12/2022)    Received from [facility]    Hunger Vital Sign     Worried About Running Out of Food in the Last Year: Never true     Ran Out of Food in the Last Year: Never true   Transportation Needs: No Transportation Needs (4/12/2022)    Received from [facility]    PRAPARE - Transportation     Lack of Transportation (Medical): No     Lack of Transportation (Non-Medical): No   Physical Activity: Sufficiently Active (4/12/2022)    Received from [facility]    Exercise Vital Sign      Days of Exercise per Week: 7 days     Minutes of Exercise per Session: 60 min   Stress: No Stress Concern Present (4/12/2022)    Received from [facility] [place] [facility] - Occupational Stress Questionnaire     Feeling of Stress : Only a little       LABS  CBC:   Recent Labs     10/03/24  1114 10/03/24  1126   WBC  --  7.89   RBC  --  4.09   HGB  --  12.0   HCT 38 38.1   PLT  --  279   MCV  --  93   MCH  --  29.3   MCHC  --  31.5*       CMP:   Recent Labs     10/03/24  1126      K 5.0      CO2 22*   BUN 13   CREATININE 0.8      CALCIUM 8.8   ALBUMIN 3.6   PROT 7.1   ALKPHOS 36*   ALT 14   AST 27   BILITOT 1.1*       INR  Recent Labs     10/03/24  1126   INR 1.1  1.1         Pre-op Assessment    I have reviewed the Patient Summary Reports.     I have reviewed the Nursing Notes. I have reviewed the NPO Status.   I have reviewed the Medications.     Review of Systems  Anesthesia Hx:  No problems with previous Anesthesia             Denies Family Hx of Anesthesia complications.    Denies Personal Hx of Anesthesia complications.                    Social:  Social Alcohol Use, Non-Smoker       Hematology/Oncology:  Hematology Normal   Oncology Normal                                   EENT/Dental:  EENT/Dental Normal           Cardiovascular:  Exercise tolerance: good                  ECHO 10/1/24 (Care Everywhere):SUMMARY:    1. Negative saline bubble study for right to left shunt.    2. Normal left ventricular systolic function. LVEF of > 55%.    3. LV diastolic function is normal.    4. Normal left ventricular strain (-19.8 %).    5. Normal right ventricular systolic function.    6. Mild tricuspid regurgitation.     Electronically signed by Onesimo Salter MD on 10/1/2024 at 8:55:13 AM                              Pulmonary:  Pulmonary Normal             Possible Obstructive Sleep Apnea , (STOP/BANG) Symptoms S - Snoring (loud)                Renal/:  Renal/ Normal                  Hepatic/GI:  Hepatic/GI Normal                 Musculoskeletal:  Musculoskeletal Normal                Neurological:  TIA,                                     Endocrine:        Obesity / BMI > 30  Dermatological:  Skin Normal    Psych:  Psychiatric Normal                    Physical Exam  General: Well nourished, Cooperative, Alert and Oriented    Airway:  Mallampati: I   Mouth Opening: Normal  TM Distance: Normal  Tongue: Normal    Dental:  Intact    Chest/Lungs:  Normal Respiratory Rate    Heart:  Rate: Normal  Rhythm: Regular Rhythm        Anesthesia Plan  Type of Anesthesia, risks & benefits discussed:    Anesthesia Type: Gen ETT  Intra-op Monitoring Plan: Standard ASA Monitors  Post Op Pain Control Plan: multimodal analgesia  Induction:  IV  Airway Plan: Direct and Video, Post-Induction  Informed Consent: Informed consent signed with the Patient and all parties understand the risks and agree with anesthesia plan.  All questions answered.   ASA Score: 2  Day of Surgery Review of History & Physical: H&P Update referred to the surgeon/provider.  Anesthesia Plan Notes: PMHx significant for possible TIA (2022 and 2024), headaches, photosensitivity, Functional neurological symptom disorder with mixed symptoms   Follow up with Neuro 9/25- TIA work up including coag panel, labs, CT head, brain MRI, CTA head and neck have been negative. PFO was discovered on echo in 2022   Neuro does not believe these two episodes to believe TIAs.  Follow up with Cardiology on 9/25 with ECHO completed on 10/1    I (Dr. Campbell )discussed with Mrs Douglas and her  at length findings on ECHO and Cards recommendation.  Both she and her  decided that her risk would be unchanged if she were to delay her surgery today.  She agrees to proceed with procedure today. She fully understands risk/benefit.    Ready For Surgery From Anesthesia Perspective.     .

## 2024-09-25 NOTE — DISCHARGE INSTRUCTIONS
YOUR PROCEDURE WILL BE AT [facility] at [address] [place] [id number]                  Before 7 AM, enter through the Emergency Entrance..   After 7 AM enter through the Main Entrance.                 Report to the Same Day Surgery Registration Desk in the hallway.(Just beside the Same Day Surgery Unit)      Your procedure  is scheduled for __10/3/2024_______.    Call [phone] between 2pm and 5pm on __10/2/2024_____to find out your arrival time for the day of surgery.    You may have two visitors.  No children under 12 years old.     You will be going to the Same Day Surgery Unit on the 2nd floor of the hospital.    Important instructions:  Do not eat anything after midnight.  You may have plain water, non carbonated.  You may also have Gatorade or Powerade after midnight.    Stop all fluids 2 hours before your surgery.    It is okay to brush your teeth.  Do not have gum, candy or mints.    SEE MEDICATION SHEET.   TAKE MEDICATIONS AS DIRECTED WITH SIPS OF WATER.      All GLP-1 weekly diabetic/weight loss medications must not be taken for one week before your surgery, or your surgery could be canceled.      STOP taking  Ibuprofen,  Advil, Motrin, Mobic(meloxicam), Aleve (naproxen), Fish oil, and Vitamin E for at least 7 days before your surgery.     You may take Tylenol if needed which is not a blood thinner.    Please shower the night before and the morning of your surgery.      Use Chlorhexidine soap as instructed by your pre op nurse.   Please place clean linens on your bed the night before surgery. Please wear fresh clean clothing after each shower.    No shaving of procedural area at least 4-5 days before surgery due to increased risk of skin irritation and/or possible infection.    Contact lenses and removable denture work may not be worn during your procedure.    You may wear deodorant only. If you are having breast surgery, do not wear deodorant on the operative  side.    Do not wear powder, body lotion, perfume/cologne or make-up.    Do not wear any jewelry or have any metal on your body.    You will be asked to remove any dentures or partials for the procedure.    If you are going home on the same day of surgery, you must arrange for a family member or a friend to drive you home.  Public transportation is prohibited.  You will not be able to drive home if you were given anesthesia or sedation.    Patients who want to have their Post-op prescriptions filled from our in-house [facility], bring a Credit/Debit Card or cash with you. A co-pay may be required.  The pharmacy closes at 5:30 pm.    Wear loose fitting clothes allowing for bandages.    Please leave money and valuables home.      You may bring your cell phone.    Call the doctor if fever or illness should occur before your surgery.    Call [phone] to contact us here if needed.                            CLOTHES ON DAY OF SURGERY    SHOULDER surgery:  you must have a very oversized shirt.  Very, Very large.  You will probably have a large sling on with your arm strapped to your chest.  You will not be able to put the arm of the operated shoulder into a sleeve.  You can put the arm of the un-operated shoulder into the sleeve, but the shirt will need to be draped over the operated shoulder.       ARM or HAND surgery:  make sure that your sleeves are large and loose enough to pass over large dressings or cast.      BREAST or UNDERARM surgery:  wear a loose, button down shirt so that you can dress without raising your arms over your head.    ABDOMINAL surgery:  wear loose, comfortable clothing.  Nothing tight around the abdomen.  NO JEANS    PENIS or SCROTAL surgery:  loose comfortable clothing.  Large sweat pants, pajama pants or a robe.  ABSOLUTELY NO JEANS      LEG or FOOT surgery:  wear large loose pants that are able to pass over any large dressings or casts.  You could also wear loose shorts or a skirt.

## 2024-10-01 ENCOUNTER — LAB VISIT (OUTPATIENT)
Dept: LAB | Facility: HOSPITAL | Age: 44
End: 2024-10-01
Attending: OBSTETRICS & GYNECOLOGY
Payer: COMMERCIAL

## 2024-10-01 DIAGNOSIS — Z01.818 PREOPERATIVE TESTING: ICD-10-CM

## 2024-10-01 LAB
ABO + RH BLD: NORMAL
BLD GP AB SCN CELLS X3 SERPL QL: NORMAL
SPECIMEN OUTDATE: NORMAL

## 2024-10-01 PROCEDURE — 86900 BLOOD TYPING SEROLOGIC ABO: CPT | Performed by: OBSTETRICS & GYNECOLOGY

## 2024-10-01 PROCEDURE — 86901 BLOOD TYPING SEROLOGIC RH(D): CPT | Performed by: OBSTETRICS & GYNECOLOGY

## 2024-10-01 PROCEDURE — 86850 RBC ANTIBODY SCREEN: CPT | Performed by: OBSTETRICS & GYNECOLOGY

## 2024-10-01 PROCEDURE — 36415 COLL VENOUS BLD VENIPUNCTURE: CPT | Performed by: OBSTETRICS & GYNECOLOGY

## 2024-10-03 ENCOUNTER — ANESTHESIA (OUTPATIENT)
Dept: SURGERY | Facility: HOSPITAL | Age: 44
End: 2024-10-03
Payer: COMMERCIAL

## 2024-10-03 ENCOUNTER — HOSPITAL ENCOUNTER (INPATIENT)
Facility: HOSPITAL | Age: 44
LOS: 2 days | Discharge: HOME | DRG: 071 | End: 2024-10-05
Attending: EMERGENCY MEDICINE | Admitting: HOSPITALIST
Payer: COMMERCIAL

## 2024-10-03 DIAGNOSIS — R40.4 TRANSIENT ALTERATION OF AWARENESS: ICD-10-CM

## 2024-10-03 DIAGNOSIS — R29.818 ACUTE FOCAL NEUROLOGICAL DEFICIT: ICD-10-CM

## 2024-10-03 DIAGNOSIS — I63.9 STROKE: ICD-10-CM

## 2024-10-03 DIAGNOSIS — Z90.79 STATUS POST BILATERAL SALPINGECTOMY: ICD-10-CM

## 2024-10-03 DIAGNOSIS — R47.01 APHASIA: Primary | ICD-10-CM

## 2024-10-03 DIAGNOSIS — R07.9 CHEST PAIN: ICD-10-CM

## 2024-10-03 PROBLEM — R41.82 ALTERED MENTAL STATUS: Status: ACTIVE | Noted: 2024-10-03

## 2024-10-03 PROBLEM — E87.6 HYPOKALEMIA: Status: RESOLVED | Noted: 2022-04-10 | Resolved: 2024-10-03

## 2024-10-03 PROBLEM — R25.2 MUSCLE CRAMPS: Status: RESOLVED | Noted: 2022-04-10 | Resolved: 2024-10-03

## 2024-10-03 LAB
ALBUMIN SERPL BCP-MCNC: 3.6 G/DL (ref 3.5–5.2)
ALLENS TEST: ABNORMAL
ALP SERPL-CCNC: 36 U/L (ref 55–135)
ALT SERPL W/O P-5'-P-CCNC: 14 U/L (ref 10–44)
ANION GAP SERPL CALC-SCNC: 14 MMOL/L (ref 8–16)
ANION GAP SERPL CALC-SCNC: 7 MMOL/L (ref 8–16)
APICAL FOUR CHAMBER EJECTION FRACTION: 65 %
ASCENDING AORTA: 3.22 CM
AST SERPL-CCNC: 27 U/L (ref 10–40)
AV INDEX (PROSTH): 0.94
AV MEAN GRADIENT: 3.2 MMHG
AV PEAK GRADIENT: 5.8 MMHG
AV VALVE AREA BY VELOCITY RATIO: 3.2 CM²
AV VALVE AREA: 3.3 CM²
AV VELOCITY RATIO: 0.92
BASOPHILS # BLD AUTO: 0.08 K/UL (ref 0–0.2)
BASOPHILS NFR BLD: 1 % (ref 0–1.9)
BILIRUB SERPL-MCNC: 1.1 MG/DL (ref 0.1–1)
BUN SERPL-MCNC: 13 MG/DL (ref 6–20)
BUN SERPL-MCNC: 14 MG/DL (ref 6–30)
CALCIUM SERPL-MCNC: 8.8 MG/DL (ref 8.7–10.5)
CHLORIDE SERPL-SCNC: 105 MMOL/L (ref 95–110)
CHLORIDE SERPL-SCNC: 109 MMOL/L (ref 95–110)
CHOLEST SERPL-MCNC: 166 MG/DL (ref 120–199)
CHOLEST SERPL-MCNC: 166 MG/DL (ref 120–199)
CHOLEST/HDLC SERPL: 4 {RATIO} (ref 2–5)
CHOLEST/HDLC SERPL: 4 {RATIO} (ref 2–5)
CO2 SERPL-SCNC: 22 MMOL/L (ref 23–29)
CREAT SERPL-MCNC: 0.8 MG/DL (ref 0.5–1.4)
CREAT SERPL-MCNC: 0.8 MG/DL (ref 0.5–1.4)
CV ECHO LV RWT: 0.39 CM
DIFFERENTIAL METHOD BLD: ABNORMAL
DOP CALC AO PEAK VEL: 1.2 M/S
DOP CALC AO VTI: 23.5 CM
DOP CALC LVOT AREA: 3.5 CM2
DOP CALC LVOT DIAMETER: 2.1 CM
DOP CALC LVOT PEAK VEL: 1.1 M/S
DOP CALC LVOT STROKE VOLUME: 76.9 CM3
DOP CALCLVOT PEAK VEL VTI: 22.2 CM
E WAVE DECELERATION TIME: 224.62 MSEC
E/A RATIO: 1.19
E/E' RATIO: 8.42 M/S
ECHO LV POSTERIOR WALL: 1 CM (ref 0.6–1.1)
EOSINOPHIL # BLD AUTO: 0.2 K/UL (ref 0–0.5)
EOSINOPHIL NFR BLD: 2.5 % (ref 0–8)
ERYTHROCYTE [DISTWIDTH] IN BLOOD BY AUTOMATED COUNT: 12.4 % (ref 11.5–14.5)
EST. GFR  (NO RACE VARIABLE): >60 ML/MIN/1.73 M^2
FRACTIONAL SHORTENING: 33.3 % (ref 28–44)
GLUCOSE SERPL-MCNC: 108 MG/DL (ref 70–110)
GLUCOSE SERPL-MCNC: 111 MG/DL (ref 70–110)
HCT VFR BLD AUTO: 38.1 % (ref 37–48.5)
HCT VFR BLD CALC: 38 %PCV (ref 36–54)
HDLC SERPL-MCNC: 42 MG/DL (ref 40–75)
HDLC SERPL-MCNC: 42 MG/DL (ref 40–75)
HDLC SERPL: 25.3 % (ref 20–50)
HDLC SERPL: 25.3 % (ref 20–50)
HGB BLD-MCNC: 12 G/DL (ref 12–16)
IMM GRANULOCYTES # BLD AUTO: 0.03 K/UL (ref 0–0.04)
IMM GRANULOCYTES NFR BLD AUTO: 0.4 % (ref 0–0.5)
INR PPP: 1.1 (ref 0.8–1.2)
INR PPP: 1.1 (ref 0.8–1.2)
INTERVENTRICULAR SEPTUM: 1.1 CM (ref 0.6–1.1)
IVC DIAMETER: 1.23 CM
LA MAJOR: 4.23 CM
LA MINOR: 4.65 CM
LA WIDTH: 3.9 CM
LDLC SERPL CALC-MCNC: 107.4 MG/DL (ref 63–159)
LDLC SERPL CALC-MCNC: 107.4 MG/DL (ref 63–159)
LEFT ATRIUM SIZE: 3.6 CM
LEFT ATRIUM VOLUME: 52.87 CM3
LEFT INTERNAL DIMENSION IN SYSTOLE: 3.4 CM (ref 2.1–4)
LEFT VENTRICLE DIASTOLIC VOLUME: 124.4 ML
LEFT VENTRICLE END DIASTOLIC VOLUME APICAL 4 CHAMBER: 80.65 ML
LEFT VENTRICLE SYSTOLIC VOLUME: 48.96 ML
LEFT VENTRICULAR INTERNAL DIMENSION IN DIASTOLE: 5.1 CM (ref 3.5–6)
LEFT VENTRICULAR MASS: 200.8 G
LV LATERAL E/E' RATIO: 7.27 M/S
LV SEPTAL E/E' RATIO: 10 M/S
LVED V (TEICH): 124.4 ML
LVES V (TEICH): 48.96 ML
LVOT MG: 2.56 MMHG
LVOT MV: 0.76 CM/S
LYMPHOCYTES # BLD AUTO: 1.9 K/UL (ref 1–4.8)
LYMPHOCYTES NFR BLD: 24.1 % (ref 18–48)
MCH RBC QN AUTO: 29.3 PG (ref 27–31)
MCHC RBC AUTO-ENTMCNC: 31.5 G/DL (ref 32–36)
MCV RBC AUTO: 93 FL (ref 82–98)
MONOCYTES # BLD AUTO: 0.2 K/UL (ref 0.3–1)
MONOCYTES NFR BLD: 2.9 % (ref 4–15)
MV PEAK A VEL: 0.67 M/S
MV PEAK E VEL: 0.8 M/S
MV STENOSIS PRESSURE HALF TIME: 65.14 MS
MV VALVE AREA P 1/2 METHOD: 3.38 CM2
NEUTROPHILS # BLD AUTO: 5.5 K/UL (ref 1.8–7.7)
NEUTROPHILS NFR BLD: 69.1 % (ref 38–73)
NONHDLC SERPL-MCNC: 124 MG/DL
NONHDLC SERPL-MCNC: 124 MG/DL
NRBC BLD-RTO: 0 /100 WBC
OHS CV RV/LV RATIO: 0.69 CM
PISA TR MAX VEL: 1.72 M/S
PLATELET # BLD AUTO: 279 K/UL (ref 150–450)
PMV BLD AUTO: 9.9 FL (ref 9.2–12.9)
POC IONIZED CALCIUM: 1.27 MMOL/L (ref 1.06–1.42)
POC TCO2 (MEASURED): 26 MMOL/L (ref 23–29)
POTASSIUM BLD-SCNC: 4.4 MMOL/L (ref 3.5–5.1)
POTASSIUM SERPL-SCNC: 5 MMOL/L (ref 3.5–5.1)
PROT SERPL-MCNC: 7.1 G/DL (ref 6–8.4)
PROTHROMBIN TIME: 11.8 SEC (ref 9–12.5)
PROTHROMBIN TIME: 11.8 SEC (ref 9–12.5)
PV PEAK GRADIENT: 3 MMHG
PV PEAK VELOCITY: 0.93 M/S
RA MAJOR: 3.29 CM
RA PRESSURE ESTIMATED: 3 MMHG
RA WIDTH: 3 CM
RBC # BLD AUTO: 4.09 M/UL (ref 4–5.4)
RIGHT VENTRICLE DIASTOLIC BASEL DIMENSION: 3.5 CM
RIGHT VENTRICULAR END-DIASTOLIC DIMENSION: 3.52 CM
RV TB RVSP: 5 MMHG
RV TISSUE DOPPLER FREE WALL SYSTOLIC VELOCITY 1 (APICAL 4 CHAMBER VIEW): 16.35 CM/S
SAMPLE: ABNORMAL
SINUS: 3.07 CM
SITE: ABNORMAL
SODIUM BLD-SCNC: 139 MMOL/L (ref 136–145)
SODIUM SERPL-SCNC: 138 MMOL/L (ref 136–145)
STJ: 3.1 CM
TDI LATERAL: 0.11 M/S
TDI SEPTAL: 0.08 M/S
TDI: 0.1 M/S
TR MAX PG: 12 MMHG
TRICUSPID ANNULAR PLANE SYSTOLIC EXCURSION: 2.21 CM
TRIGL SERPL-MCNC: 83 MG/DL (ref 30–150)
TRIGL SERPL-MCNC: 83 MG/DL (ref 30–150)
TSH SERPL DL<=0.005 MIU/L-ACNC: 0.83 UIU/ML (ref 0.4–4)
TSH SERPL DL<=0.005 MIU/L-ACNC: 0.83 UIU/ML (ref 0.4–4)
TV REST PULMONARY ARTERY PRESSURE: 15 MMHG
WBC # BLD AUTO: 7.89 K/UL (ref 3.9–12.7)

## 2024-10-03 PROCEDURE — 85025 COMPLETE CBC W/AUTO DIFF WBC: CPT | Performed by: EMERGENCY MEDICINE

## 2024-10-03 PROCEDURE — 99900035 HC TECH TIME PER 15 MIN (STAT)

## 2024-10-03 PROCEDURE — 99285 EMERGENCY DEPT VISIT HI MDM: CPT | Mod: 25

## 2024-10-03 PROCEDURE — 82962 GLUCOSE BLOOD TEST: CPT

## 2024-10-03 PROCEDURE — G0425 INPT/ED TELECONSULT30: HCPCS | Mod: GT,,, | Performed by: PSYCHIATRY & NEUROLOGY

## 2024-10-03 PROCEDURE — 63600175 PHARM REV CODE 636 W HCPCS: Performed by: HOSPITALIST

## 2024-10-03 PROCEDURE — 84132 ASSAY OF SERUM POTASSIUM: CPT

## 2024-10-03 PROCEDURE — 82330 ASSAY OF CALCIUM: CPT

## 2024-10-03 PROCEDURE — 84295 ASSAY OF SERUM SODIUM: CPT

## 2024-10-03 PROCEDURE — 85014 HEMATOCRIT: CPT

## 2024-10-03 PROCEDURE — 82565 ASSAY OF CREATININE: CPT

## 2024-10-03 PROCEDURE — 25500020 PHARM REV CODE 255: Performed by: EMERGENCY MEDICINE

## 2024-10-03 PROCEDURE — 85610 PROTHROMBIN TIME: CPT | Performed by: EMERGENCY MEDICINE

## 2024-10-03 PROCEDURE — 11000001 HC ACUTE MED/SURG PRIVATE ROOM

## 2024-10-03 PROCEDURE — 25000003 PHARM REV CODE 250: Performed by: EMERGENCY MEDICINE

## 2024-10-03 PROCEDURE — 63600175 PHARM REV CODE 636 W HCPCS: Performed by: ANESTHESIOLOGY

## 2024-10-03 PROCEDURE — 25000003 PHARM REV CODE 250: Performed by: NURSE ANESTHETIST, CERTIFIED REGISTERED

## 2024-10-03 PROCEDURE — 80061 LIPID PANEL: CPT | Performed by: EMERGENCY MEDICINE

## 2024-10-03 PROCEDURE — 84443 ASSAY THYROID STIM HORMONE: CPT | Performed by: EMERGENCY MEDICINE

## 2024-10-03 PROCEDURE — 80053 COMPREHEN METABOLIC PANEL: CPT | Performed by: EMERGENCY MEDICINE

## 2024-10-03 PROCEDURE — 63600175 PHARM REV CODE 636 W HCPCS: Performed by: NURSE ANESTHETIST, CERTIFIED REGISTERED

## 2024-10-03 PROCEDURE — 93010 ELECTROCARDIOGRAM REPORT: CPT | Mod: ,,, | Performed by: INTERNAL MEDICINE

## 2024-10-03 PROCEDURE — 93005 ELECTROCARDIOGRAM TRACING: CPT

## 2024-10-03 RX ORDER — KETOROLAC TROMETHAMINE 30 MG/ML
15 INJECTION, SOLUTION INTRAMUSCULAR; INTRAVENOUS EVERY 6 HOURS PRN
Status: DISCONTINUED | OUTPATIENT
Start: 2024-10-03 | End: 2024-10-05 | Stop reason: HOSPADM

## 2024-10-03 RX ORDER — ASPIRIN 300 MG/1
300 SUPPOSITORY RECTAL DAILY
Status: DISCONTINUED | OUTPATIENT
Start: 2024-10-04 | End: 2024-10-05 | Stop reason: HOSPADM

## 2024-10-03 RX ORDER — RIMEGEPANT SULFATE 75 MG/75MG
75 TABLET, ORALLY DISINTEGRATING ORAL DAILY PRN
COMMUNITY
Start: 2024-09-25

## 2024-10-03 RX ORDER — ONDANSETRON HYDROCHLORIDE 2 MG/ML
4 INJECTION, SOLUTION INTRAVENOUS EVERY 6 HOURS PRN
Status: DISCONTINUED | OUTPATIENT
Start: 2024-10-03 | End: 2024-10-05 | Stop reason: HOSPADM

## 2024-10-03 RX ORDER — PROPOFOL 10 MG/ML
VIAL (ML) INTRAVENOUS
Status: DISCONTINUED | OUTPATIENT
Start: 2024-10-03 | End: 2024-10-03

## 2024-10-03 RX ORDER — LIDOCAINE HYDROCHLORIDE 20 MG/ML
INJECTION INTRAVENOUS
Status: DISCONTINUED | OUTPATIENT
Start: 2024-10-03 | End: 2024-10-03

## 2024-10-03 RX ORDER — DEXAMETHASONE SODIUM PHOSPHATE 4 MG/ML
INJECTION, SOLUTION INTRA-ARTICULAR; INTRALESIONAL; INTRAMUSCULAR; INTRAVENOUS; SOFT TISSUE
Status: DISCONTINUED | OUTPATIENT
Start: 2024-10-03 | End: 2024-10-03

## 2024-10-03 RX ORDER — ASPIRIN 300 MG/1
300 SUPPOSITORY RECTAL
Status: COMPLETED | OUTPATIENT
Start: 2024-10-03 | End: 2024-10-03

## 2024-10-03 RX ORDER — ONDANSETRON HYDROCHLORIDE 2 MG/ML
INJECTION, SOLUTION INTRAVENOUS
Status: DISCONTINUED | OUTPATIENT
Start: 2024-10-03 | End: 2024-10-03

## 2024-10-03 RX ORDER — ROCURONIUM BROMIDE 10 MG/ML
INJECTION, SOLUTION INTRAVENOUS
Status: DISCONTINUED | OUTPATIENT
Start: 2024-10-03 | End: 2024-10-03

## 2024-10-03 RX ORDER — ACETAMINOPHEN 325 MG/1
650 TABLET ORAL EVERY 6 HOURS PRN
Status: DISCONTINUED | OUTPATIENT
Start: 2024-10-03 | End: 2024-10-05 | Stop reason: HOSPADM

## 2024-10-03 RX ORDER — ENOXAPARIN SODIUM 100 MG/ML
40 INJECTION SUBCUTANEOUS EVERY 24 HOURS
Status: DISCONTINUED | OUTPATIENT
Start: 2024-10-04 | End: 2024-10-05 | Stop reason: HOSPADM

## 2024-10-03 RX ORDER — PHENYLEPHRINE HYDROCHLORIDE 10 MG/ML
INJECTION INTRAVENOUS
Status: DISCONTINUED | OUTPATIENT
Start: 2024-10-03 | End: 2024-10-03

## 2024-10-03 RX ORDER — FENTANYL CITRATE 50 UG/ML
INJECTION, SOLUTION INTRAMUSCULAR; INTRAVENOUS
Status: DISCONTINUED | OUTPATIENT
Start: 2024-10-03 | End: 2024-10-03

## 2024-10-03 RX ORDER — MIDAZOLAM HYDROCHLORIDE 1 MG/ML
INJECTION INTRAMUSCULAR; INTRAVENOUS
Status: DISCONTINUED | OUTPATIENT
Start: 2024-10-03 | End: 2024-10-03

## 2024-10-03 RX ADMIN — ROCURONIUM BROMIDE 50 MG: 10 INJECTION, SOLUTION INTRAVENOUS at 09:10

## 2024-10-03 RX ADMIN — ROCURONIUM BROMIDE 20 MG: 10 INJECTION, SOLUTION INTRAVENOUS at 09:10

## 2024-10-03 RX ADMIN — ASPIRIN 300 MG: 300 SUPPOSITORY RECTAL at 02:10

## 2024-10-03 RX ADMIN — ONDANSETRON 4 MG: 2 INJECTION, SOLUTION INTRAMUSCULAR; INTRAVENOUS at 09:10

## 2024-10-03 RX ADMIN — SUGAMMADEX 200 MG: 100 INJECTION, SOLUTION INTRAVENOUS at 09:10

## 2024-10-03 RX ADMIN — SODIUM CHLORIDE, POTASSIUM CHLORIDE, SODIUM LACTATE AND CALCIUM CHLORIDE: 600; 310; 30; 20 INJECTION, SOLUTION INTRAVENOUS at 10:10

## 2024-10-03 RX ADMIN — FENTANYL CITRATE 25 MCG: 0.05 INJECTION, SOLUTION INTRAMUSCULAR; INTRAVENOUS at 09:10

## 2024-10-03 RX ADMIN — KETOROLAC TROMETHAMINE 15 MG: 30 INJECTION, SOLUTION INTRAMUSCULAR; INTRAVENOUS at 05:10

## 2024-10-03 RX ADMIN — FENTANYL CITRATE 100 MCG: 0.05 INJECTION, SOLUTION INTRAMUSCULAR; INTRAVENOUS at 09:10

## 2024-10-03 RX ADMIN — DEXAMETHASONE SODIUM PHOSPHATE 4 MG: 4 INJECTION, SOLUTION INTRAMUSCULAR; INTRAVENOUS at 09:10

## 2024-10-03 RX ADMIN — PHENYLEPHRINE HYDROCHLORIDE 50 MCG: 10 INJECTION INTRAVENOUS at 09:10

## 2024-10-03 RX ADMIN — PROPOFOL 200 MG: 10 INJECTION, EMULSION INTRAVENOUS at 09:10

## 2024-10-03 RX ADMIN — IOHEXOL 75 ML: 350 INJECTION, SOLUTION INTRAVENOUS at 11:10

## 2024-10-03 RX ADMIN — LIDOCAINE HYDROCHLORIDE 100 MG: 20 INJECTION, SOLUTION INTRAVENOUS at 09:10

## 2024-10-03 RX ADMIN — GLYCOPYRROLATE 0.2 MG: 0.2 INJECTION, SOLUTION INTRAMUSCULAR; INTRAVITREAL at 08:10

## 2024-10-03 RX ADMIN — PHENYLEPHRINE HYDROCHLORIDE 100 MCG: 10 INJECTION INTRAVENOUS at 09:10

## 2024-10-03 RX ADMIN — MIDAZOLAM HYDROCHLORIDE 2 MG: 1 INJECTION INTRAMUSCULAR; INTRAVENOUS at 08:10

## 2024-10-03 NOTE — ED PROVIDER NOTES
Encounter Date: 10/3/2024       History     Chief Complaint   Patient presents with    Altered Mental Status     Patient was in PACU s/p bilateral salpingestomy and IUD removal and became acutely altered. Patient presents to ED from PACU for stroke workup. MD Meraz at bedside.     44-year-old female with history of TIA, history of PFO, presents to the emergency department from the PACU with altered mental status.  The patient had a laparoscopic salpingectomy today.  After the procedure, she was doing well, she returned to her mental status baseline following anesthesia.  Around 10:30 a.m., RN noted change in mental status.  The patient is no longer responding verbally.  She is brought to the ER for an acute stroke activation.  Patient is unable to provide history as she is aphasic.    Patient's sister calls, she reports she, the sister, has a history of PA 1 mutation causing a blood clotting disorder.  She reports both she and her sister have had strokes in the past.    History provided by: Anesthesiologist, Dr. Messina.     Review of patient's allergies indicates:   Allergen Reactions    Pcn [penicillins]      Past Medical History:   Diagnosis Date    Obesity, unspecified     TIA (transient ischemic attack)      No past surgical history on file.  Family History   Problem Relation Name Age of Onset    Breast cancer Maternal Aunt      Breast cancer Maternal Grandmother       Social History     Tobacco Use    Smoking status: Never   Substance Use Topics    Alcohol use: Yes     Alcohol/week: 0.0 standard drinks of alcohol    Drug use: Never     Review of Systems   Unable to perform ROS: Mental status change       Physical Exam     Initial Vitals   BP Pulse Resp Temp SpO2   10/03/24 1108 10/03/24 1108 10/03/24 1108 10/03/24 1317 10/03/24 1108   (!) 144/76 (!) 55 18 98.3 °F (36.8 °C) 100 %      MAP       --                Physical Exam    Constitutional: She appears well-developed and well-nourished. She is not  diaphoretic. No distress.   HENT:   Head: Normocephalic and atraumatic.   Eyes: Conjunctivae are normal. Pupils are equal, round, and reactive to light.   Neck: Neck supple.   Cardiovascular:  Normal rate and regular rhythm.           Pulmonary/Chest: Breath sounds normal.   Abdominal: Abdomen is soft.   Musculoskeletal:      Cervical back: Neck supple.     Neurological: She is alert.   Expressive aphasia, patient  my hands with both upper extremities, very poor effort, I do not appreciate a strength discrepancy between the left in the right side.  She does not move her legs when asked.   Skin: Skin is warm and dry.         ED Course   Critical Care    Date/Time: 10/3/2024 2:35 PM    Performed by: Ivet Ga MD  Authorized by: Jeane Madden MD  Total critical care time (exclusive of procedural time) : 0 minutes  Comments: Please put in 45 minutes of critical care due to patient having a high risk of CNS failure.   Separate from teaching and exclusive of procedure and ekg time  Includes:  Time at bedside  Time reviewing test results  Time discussing case with staff  Time documenting the medical record  Time spent with family members  Time spent with consults  Management            Labs Reviewed   CBC W/ AUTO DIFFERENTIAL - Abnormal       Result Value    WBC 7.89      RBC 4.09      Hemoglobin 12.0      Hematocrit 38.1      MCV 93      MCH 29.3      MCHC 31.5 (*)     RDW 12.4      Platelets 279      MPV 9.9      Immature Granulocytes 0.4      Gran # (ANC) 5.5      Immature Grans (Abs) 0.03      Lymph # 1.9      Mono # 0.2 (*)     Eos # 0.2      Baso # 0.08      nRBC 0      Gran % 69.1      Lymph % 24.1      Mono % 2.9 (*)     Eosinophil % 2.5      Basophil % 1.0      Differential Method Automated     COMPREHENSIVE METABOLIC PANEL - Abnormal    Sodium 138      Potassium 5.0      Chloride 109      CO2 22 (*)     Glucose 108      BUN 13      Creatinine 0.8      Calcium 8.8      Total Protein 7.1       Albumin 3.6      Total Bilirubin 1.1 (*)     Alkaline Phosphatase 36 (*)     AST 27      ALT 14      eGFR >60      Anion Gap 7 (*)    ISTAT PROCEDURE - Abnormal    POC Glucose 111 (*)     POC BUN 14      POC Creatinine 0.8      POC Sodium 139      POC Potassium 4.4      POC Chloride 105      POC TCO2 (MEASURED) 26      POC Anion Gap 14      POC Ionized Calcium 1.27      POC Hematocrit 38      Sample VENOUS      Site Other      Allens Test N/A     PROTIME-INR    Prothrombin Time 11.8      INR 1.1     TSH    TSH 0.826     LIPID PANEL    Cholesterol 166      Triglycerides 83      HDL 42      LDL Cholesterol 107.4      HDL/Cholesterol Ratio 25.3      Total Cholesterol/HDL Ratio 4.0      Non-HDL Cholesterol 124     PROTIME-INR    Prothrombin Time 11.8      INR 1.1     TSH    TSH 0.826     LIPID PANEL    Cholesterol 166      Triglycerides 83      HDL 42      LDL Cholesterol 107.4      HDL/Cholesterol Ratio 25.3      Total Cholesterol/HDL Ratio 4.0      Non-HDL Cholesterol 124     HEMOGLOBIN A1C     EKG Readings: (Independently Interpreted)   Normal sinus rhythm, rate 61 beats per minute, normal VT interval,  milliseconds, no STEMI.       Imaging Results              CTA Head and Neck (xpd) (Final result)  Result time 10/03/24 11:41:53      Final result by Brendon Hernandez MD (10/03/24 11:41:53)                   Impression:      No definite acute intracranial findings by CT.    No evidence of acute large vessel occlusion or flow-limiting stenosis.    Additional details, as provided in the body of the report.      Electronically signed by: Brendon Hernandez  Date:    10/03/2024  Time:    11:41               Narrative:    EXAMINATION:  CTA HEAD AND NECK (XPD)    CLINICAL HISTORY:  Neuro deficit, acute, stroke suspected;    TECHNIQUE:  Non contrast low dose axial images were obtained through the head.  CT angiogram was performed from the level of the katarzyna to the top of the head following the IV administration of  75mL of Omnipaque 350.   Sagittal and coronal reconstructions and maximum intensity projection reconstructions were performed. Arterial stenosis percentages are based on NASCET measurement criteria.    COMPARISON:  None    FINDINGS:  CT HEAD:    Blood: No acute intracranial hemorrhage.    Parenchyma: No definite loss of gray-white differentiation to suggest acute or subacute transcortical infarct.    Ventricles/Extra-axial spaces: No abnormal extra-axial fluid collection. Basal cisterns are patent.    Vessels: Grossly unremarkable for additional details below.    Orbits: Unremarkable.    Scalp: Unremarkable.    Skull: There are no depressed skull fractures or destructive bone lesions.    Sinuses and mastoids: Scattered relatively modest paranasal sinus mucosal thickening with small retention cysts.    Other findings: Partially empty sella configuration.    CTA:    NECK:    Imaged aortic arch: Normal caliber.  Left-sided arch with conventional 3 vessel arch anatomy.    Right common and cervical internal carotid arteries: Normal    Left common and cervical internal carotid arteries: Normal    Right cervical vertebral artery: There is no hemodynamically significant stenosis or dissection    Left cervical vertebral artery: There is no hemodynamically significant stenosis or dissection.    HEAD:    Right anterior circulation:Normal Right ophthalmic artery is patent.    Left anterior circulation: NormalLeft ophthalmic artery is patent.    Posterior circulation: There is no hemodynamically significant vertebrobasilar stenosis. There is no significant PCA stenosis. Posterior inferior cerebellar arteries patent at origin.    Anterior and posterior communicating arteries: Small patent anterior communicating artery.  Posterior communicating arteries not reliably visualized.    NON-ANGIOGRAPHIC FINDINGS:    Visualized intracranial contents: As above.    Soft tissues of the neck: Unremarkable.    Visualized sinuses: As  above.    Dentition: Unremarkable.    Spine: Grossly unremarkable for age.    Visualized lungs: Minor dependent atelectasis.  Nonspecific thin wall cystic foci in the left upper lobe, doubtful significance.                                       Medications   dextrose 10% bolus 125 mL 125 mL (has no administration in time range)   dextrose 10% bolus 250 mL 250 mL (has no administration in time range)   enoxaparin injection 40 mg (has no administration in time range)   acetaminophen tablet 650 mg (has no administration in time range)   ondansetron injection 4 mg (has no administration in time range)   iohexoL (OMNIPAQUE 350) injection 75 mL (75 mLs Intravenous Given 10/3/24 1120)   aspirin suppository 300 mg (300 mg Rectal Given 10/3/24 1412)     Medical Decision Making  44-year-old female presenting from PACU with acute change in mental status, symptoms started at 10:30 a.m., per report, she had recovered from the anesthesia portion of her procedure.  On exam, the patient has expressive aphasia.  Poor effort with strength testing in bilateral upper extremities, she does not move her lower extremities for me when asked.  A stroke activation initiated, will get CTA of the head and neck.    Amount and/or Complexity of Data Reviewed  Labs: ordered.     Details: TSH within normal limits.  CMP shows a bilirubin of 1.1, alk phos 36, bicarb 22, otherwise within acceptable limits.  CBC shows no leukocytosis, normal H&H, normal platelet count, INR 1.1, lipid panel within acceptable range  Radiology: ordered.     Details: No definitive acute intracranial findings, no LV 0 or flow-limiting stenosis .    ECG/medicine tests: ordered and independent interpretation performed.    Risk  OTC drugs.  Prescription drug management.  Decision regarding hospitalization.               ED Course as of 10/03/24 1435   Thu Oct 03, 2024   1145 The patient was seen by the vascular neurologist, Dr. Dawson, he does not recommend TNK.  He has  reviewed the CTA, no large vessel occlusion.  Recommends MRI of the brain.  I reviewed these test results and care plan with the patient.  She remains aphasic but is able to get some words out with great difficulty.  Moving right side now, not moving left side. [LH]   1303 Patient's mother at bedside,  on phone, updated regarding test results and care plan. [LH]   1307 Case reviewed with Dr. Madden for hospital admission.  []      ED Course User Index  [] Ivet Ga MD                         This dictation has been generated using M-Modal Fluency Direct dictation; some phonetic errors may occur.       Clinical Impression:  Final diagnoses:  [R29.818] Acute focal neurological deficit  [R47.01] Aphasia (Primary)          ED Disposition Condition    Admit                 Ivet Ga MD  10/03/24 [id number]

## 2024-10-03 NOTE — ASSESSMENT & PLAN NOTE
Acute onset aphasia after having recovered completely from surgery. CTH, CTA no CVA/LVO  - check MRI brain  - NPO for now- rectal asa given  - TTE with bubble study  - apparently there is a family history of clotting disorder- need to parse this out further with family when they are able  - monitor on tele  - PT, OT, ST consults- can't participate now, likely tomorrow  - Neuro consult

## 2024-10-03 NOTE — ANESTHESIA POSTPROCEDURE EVALUATION
Anesthesia Post Evaluation    Patient: Flakita Douglas    Procedure(s) Performed: Procedure(s) (LRB):  SALPINGECTOMY, LAPAROSCOPIC (Bilateral)  REMOVAL, INTRAUTERINE DEVICE (N/A)    Final Anesthesia Type: general      Patient location during evaluation: GI PACU  Patient participation: Yes- Able to Participate  Level of consciousness: awake and alert and oriented  Post-procedure vital signs: reviewed and stable  Pain management: adequate  Airway patency: patent    PONV status at discharge: No PONV  Anesthetic complications: yes  Perioperative Events: other periop events (see comments)      Jo-operative Events Comments: Unexpected TIA or CVA in PACU   Cardiovascular status: blood pressure returned to baseline and hemodynamically stable  Respiratory status: unassisted, spontaneous ventilation and room air  Hydration status: euvolemic  Follow-up not needed.  Comments: Received call from PACU nurse stating patient is behaving odd.   At bedside on physical exam: patient is aphasic with BLE & BUE weakness also noted. Patient is nonverbal but able to follow commands and answer yes or no questions by nodding head.     Activated code stroke given abrupt change in patient's mental status & given history of TIA / CVAs. House supervisor + ICU nurses at bedside to assist with moving patient to ED for emergency evaluation + emergency head CT.              Vitals Value Taken Time   /80 10/03/24 1233   Temp 36.4 °C (97.5 °F) 10/03/24 1005   Pulse 62 10/03/24 1233   Resp 17 10/03/24 1233   SpO2 99 % 10/03/24 1233         Event Time   Out of Recovery 10:54:00         Pain/Mireille Score: Pain Rating Prior to Med Admin: 0 (10/3/2024 10:54 AM)  Mireille Score: 9 (10/3/2024 10:45 AM)

## 2024-10-03 NOTE — HPI
Ms Flakita Douglas is a 44 y.o. woman who presented to ED as code stroke from PACU.     She has no chronic medical problems. She had laparoscopic salpingectomy and IUD removal in OR today with Dr Ferrer. She recovered in PACU, was at baseline status, then had sudden onset aphasia at 10:30AM. Code stroke called. She was transferred to ED. CTH no acute changes and CTA no LVO. She was given rectal asa.    She is currently lethargic, lying in bed with her head turned to the R side. She opens eyes to touch/voice and seems to be trying to speak but does not speak. She tries to follow commands but is very slow to respond and diffusely weak.     ED spoke to sister who is speech therapist- apparently both patients parent and patient sister have an inherited clotting disorder and have had stroke at young age. Unable to reach sister when called at 1:28PM.     Admitted for further workup.

## 2024-10-03 NOTE — SUBJECTIVE & OBJECTIVE
HPI:  44 y.o. female with reported history of TIA and PFO who underwent elective laparoscopic laparoscopic salpingectomy earlier today, woke up normal after anesthesia but subsequently became altered, globally weak, and no able to talk.  Improving.     Images personally reviewed and interpreted:  Study: Head CT and CTA Head & Neck  Study Interpretation: no acute abnormality.  No LVO, high grade stenosis, or significant carotid disease     Assessment and plan:  AMS with weakness more localized to the BUE and trouble speaking.  Low index of suspicion for an acute neurovascular insult.   Recommended MRI brain to better characterize her symptoms.     Lytics recommendation: Thrombolytic therapy not recommended due to Suspected stroke mimic   Thrombectomy recommendation: No; No large vessel occlusion identified on imaging   Placement recommendation: pending further studies

## 2024-10-03 NOTE — H&P
[facility]  History & Physical    Patient Name: Flakita Douglas  MRN: [id number]  Patient Class: IP- Inpatient  Admission Date: 10/3/2024  Attending Physician: Jeane Madden MD   Primary Care Provider: Ivet Marvin MD         Patient information was obtained from ER records.     Subjective:     Principal Problem:Altered mental status    Chief Complaint:   Chief Complaint   Patient presents with    Altered Mental Status     Patient was in PACU s/p bilateral salpingestomy and IUD removal and became acutely altered. Patient presents to ED from PACU for stroke workup. MD Meraz at bedside.        HPI: Ms Flakita Douglas is a 44 y.o. woman who presented to ED as code stroke from PACU.     She has no chronic medical problems. She had laparoscopic salpingectomy and IUD removal in OR today with Dr Ferrer. She recovered in PACU, was at baseline status, then had sudden onset aphasia at 10:30AM. Code stroke called. She was transferred to ED. CTH no acute changes and CTA no LVO. She was given rectal asa.    She is currently lethargic, lying in bed with her head turned to the R side. She opens eyes to touch/voice and seems to be trying to speak but does not speak. She tries to follow commands but is very slow to respond and diffusely weak.     ED spoke to sister who is speech therapist- apparently both patients parent and patient sister have an inherited clotting disorder and have had stroke at young age. Unable to reach sister when called at 1:28PM.     Admitted for further workup.     Past Medical History:   Diagnosis Date    Obesity, unspecified     TIA (transient ischemic attack)        No past surgical history on file.    Review of patient's allergies indicates:   Allergen Reactions    Pcn [penicillins]        Current Facility-Administered Medications on File Prior to Encounter   Medication    [COMPLETED] acetaminophen tablet 1,000 mg    [DISCONTINUED] 0.9%  NaCl infusion     [DISCONTINUED] dexAMETHasone injection    [DISCONTINUED] diphenhydrAMINE injection 25 mg    [DISCONTINUED] famotidine tablet 20 mg    [DISCONTINUED] fentaNYL injection    [DISCONTINUED] glycopyrrolate (PF) injection    [DISCONTINUED] HYDROmorphone (PF) injection 0.2 mg    [DISCONTINUED] HYDROmorphone (PF) injection 0.2 mg    [DISCONTINUED] lactated ringers infusion    [DISCONTINUED] LIDOcaine (cardiac) injection    [DISCONTINUED] LIDOcaine (PF) 10 mg/ml (1%) injection 10 mg    [DISCONTINUED] LORazepam injection 0.25 mg    [DISCONTINUED] meperidine injection 12.5 mg    [DISCONTINUED] midazolam (VERSED) 1 mg/mL injection    [DISCONTINUED] mupirocin 2 % ointment    [DISCONTINUED] ondansetron disintegrating tablet 8 mg    [DISCONTINUED] ondansetron injection    [DISCONTINUED] PHENYLephrine injection    [DISCONTINUED] propofol (DIPRIVAN) 10 mg/mL infusion    [DISCONTINUED] rocuronium injection    [DISCONTINUED] sugammadex (BRIDION) 100 mg/mL injection     Current Outpatient Medications on File Prior to Encounter   Medication Sig    HYDROcodone-acetaminophen (NORCO) 5-325 mg per tablet Take 1 tablet by mouth every 6 (six) hours as needed for Pain.    ibuprofen (ADVIL,MOTRIN) 800 MG tablet Take 1 tablet (800 mg total) by mouth every 8 (eight) hours as needed for Pain.    [DISCONTINUED] aspirin (ECOTRIN) 81 MG EC tablet Take 1 tablet by mouth once daily.    [DISCONTINUED] cetirizine (ZYRTEC) 10 MG tablet TK 1 T PO D    [DISCONTINUED] multivitamin capsule Take 1 capsule by mouth.     Family History       Problem Relation (Age of Onset)    Breast cancer Maternal Aunt, Maternal Grandmother          Tobacco Use    Smoking status: Never    Smokeless tobacco: Not on file   Substance and Sexual Activity    Alcohol use: Yes     Alcohol/week: 0.0 standard drinks of alcohol    Drug use: Never    Sexual activity: Yes     Review of Systems   Unable to perform ROS: Mental status change     Objective:     Vital Signs (Most  Recent):  Temp: 98.3 °F (36.8 °C) (10/03/24 1317)  Pulse: 60 (10/03/24 1317)  Resp: 19 (10/03/24 1317)  BP: 133/87 (10/03/24 1317)  SpO2: 99 % (10/03/24 1317) Vital Signs (24h Range):  Temp:  [97.5 °F (36.4 °C)-98.3 °F (36.8 °C)] 98.3 °F (36.8 °C)  Pulse:  [55-76] 60  Resp:  [13-32] 19  SpO2:  [98 %-100 %] 99 %  BP: ()/(56-87) 133/87        There is no height or weight on file to calculate BMI.     Physical Exam  Vitals and nursing note reviewed.   Constitutional:       General: She is not in acute distress.     Appearance: She is ill-appearing. She is not toxic-appearing.   HENT:      Head: Normocephalic.      Nose: Nose normal.      Mouth/Throat:      Mouth: Mucous membranes are moist.   Eyes:      Conjunctiva/sclera: Conjunctivae normal.      Pupils: Pupils are equal, round, and reactive to light.      Comments: Unable to follow EOM commands   Cardiovascular:      Rate and Rhythm: Normal rate and regular rhythm.      Pulses: Normal pulses.      Heart sounds: Normal heart sounds.   Pulmonary:      Effort: Pulmonary effort is normal.      Breath sounds: Normal breath sounds.      Comments: Room air  Abdominal:      General: Bowel sounds are normal. There is no distension.      Palpations: Abdomen is soft.      Tenderness: There is no abdominal tenderness. There is no guarding.      Comments: Surgical sites no bleeding/bruising   Genitourinary:     Comments: Some dried blood between her legs  Musculoskeletal:      Right lower leg: No edema.      Left lower leg: No edema.   Skin:     General: Skin is warm and dry.   Neurological:      Motor: Weakness present.      Comments: Disoriented, does not speak. Opens eyes to commands but cannot follow EOM instructions. Tries to squeeze with both hands and move legs              CRANIAL NERVES     CN III, IV, VI   Pupils are equal, round, and reactive to light.       Significant Labs: All pertinent labs within the past 24 hours have been reviewed.    Significant Imaging:  I have reviewed all pertinent imaging results/findings within the past 24 hours.  Assessment/Plan:     * Altered mental status  Acute onset aphasia after having recovered completely from surgery. CTH, CTA no CVA/LVO  - check MRI brain  - NPO for now- rectal asa given  - TTE with bubble study  - apparently there is a family history of clotting disorder- need to parse this out further with family when they are able  - monitor on tele  - PT, OT, ST consults- can't participate now, likely tomorrow  - Neuro consult       Status post bilateral salpingectomy  - Completed 10/3/24  - follow up with GYN as outpatient       VTE Risk Mitigation (From admission, onward)           Ordered     enoxaparin injection 40 mg  Daily         10/03/24 1303     IP VTE HIGH RISK PATIENT  Once         10/03/24 1303     Place CODI hose  Until discontinued         10/03/24 1303     Place sequential compression device  Until discontinued         10/03/24 1303                                    Jeane Madden MD  Department of Hospital Medicine  [facility] [place] - Emergency Dept

## 2024-10-03 NOTE — SUBJECTIVE & OBJECTIVE
Past Medical History:   Diagnosis Date    Obesity, unspecified     TIA (transient ischemic attack)        No past surgical history on file.    Review of patient's allergies indicates:   Allergen Reactions    Pcn [penicillins]        Current Facility-Administered Medications on File Prior to Encounter   Medication    [COMPLETED] acetaminophen tablet 1,000 mg    [DISCONTINUED] 0.9%  NaCl infusion    [DISCONTINUED] dexAMETHasone injection    [DISCONTINUED] diphenhydrAMINE injection 25 mg    [DISCONTINUED] famotidine tablet 20 mg    [DISCONTINUED] fentaNYL injection    [DISCONTINUED] glycopyrrolate (PF) injection    [DISCONTINUED] HYDROmorphone (PF) injection 0.2 mg    [DISCONTINUED] HYDROmorphone (PF) injection 0.2 mg    [DISCONTINUED] lactated ringers infusion    [DISCONTINUED] LIDOcaine (cardiac) injection    [DISCONTINUED] LIDOcaine (PF) 10 mg/ml (1%) injection 10 mg    [DISCONTINUED] LORazepam injection 0.25 mg    [DISCONTINUED] meperidine injection 12.5 mg    [DISCONTINUED] midazolam (VERSED) 1 mg/mL injection    [DISCONTINUED] mupirocin 2 % ointment    [DISCONTINUED] ondansetron disintegrating tablet 8 mg    [DISCONTINUED] ondansetron injection    [DISCONTINUED] PHENYLephrine injection    [DISCONTINUED] propofol (DIPRIVAN) 10 mg/mL infusion    [DISCONTINUED] rocuronium injection    [DISCONTINUED] sugammadex (BRIDION) 100 mg/mL injection     Current Outpatient Medications on File Prior to Encounter   Medication Sig    HYDROcodone-acetaminophen (NORCO) 5-325 mg per tablet Take 1 tablet by mouth every 6 (six) hours as needed for Pain.    ibuprofen (ADVIL,MOTRIN) 800 MG tablet Take 1 tablet (800 mg total) by mouth every 8 (eight) hours as needed for Pain.    [DISCONTINUED] aspirin (ECOTRIN) 81 MG EC tablet Take 1 tablet by mouth once daily.    [DISCONTINUED] cetirizine (ZYRTEC) 10 MG tablet TK 1 T PO D    [DISCONTINUED] multivitamin capsule Take 1 capsule by mouth.     Family History       Problem Relation (Age of  Onset)    Breast cancer Maternal Aunt, Maternal Grandmother          Tobacco Use    Smoking status: Never    Smokeless tobacco: Not on file   Substance and Sexual Activity    Alcohol use: Yes     Alcohol/week: 0.0 standard drinks of alcohol    Drug use: Never    Sexual activity: Yes     Review of Systems   Unable to perform ROS: Mental status change     Objective:     Vital Signs (Most Recent):  Temp: 98.3 °F (36.8 °C) (10/03/24 1317)  Pulse: 60 (10/03/24 1317)  Resp: 19 (10/03/24 1317)  BP: 133/87 (10/03/24 1317)  SpO2: 99 % (10/03/24 1317) Vital Signs (24h Range):  Temp:  [97.5 °F (36.4 °C)-98.3 °F (36.8 °C)] 98.3 °F (36.8 °C)  Pulse:  [55-76] 60  Resp:  [13-32] 19  SpO2:  [98 %-100 %] 99 %  BP: ()/(56-87) 133/87        There is no height or weight on file to calculate BMI.     Physical Exam  Vitals and nursing note reviewed.   Constitutional:       General: She is not in acute distress.     Appearance: She is ill-appearing. She is not toxic-appearing.   HENT:      Head: Normocephalic.      Nose: Nose normal.      Mouth/Throat:      Mouth: Mucous membranes are moist.   Eyes:      Conjunctiva/sclera: Conjunctivae normal.      Pupils: Pupils are equal, round, and reactive to light.      Comments: Unable to follow EOM commands   Cardiovascular:      Rate and Rhythm: Normal rate and regular rhythm.      Pulses: Normal pulses.      Heart sounds: Normal heart sounds.   Pulmonary:      Effort: Pulmonary effort is normal.      Breath sounds: Normal breath sounds.      Comments: Room air  Abdominal:      General: Bowel sounds are normal. There is no distension.      Palpations: Abdomen is soft.      Tenderness: There is no abdominal tenderness. There is no guarding.      Comments: Surgical sites no bleeding/bruising   Genitourinary:     Comments: Some dried blood between her legs  Musculoskeletal:      Right lower leg: No edema.      Left lower leg: No edema.   Skin:     General: Skin is warm and dry.   Neurological:       Motor: Weakness present.      Comments: Disoriented, does not speak. Opens eyes to commands but cannot follow EOM instructions. Tries to squeeze with both hands and move legs              CRANIAL NERVES     CN III, IV, VI   Pupils are equal, round, and reactive to light.       Significant Labs: All pertinent labs within the past 24 hours have been reviewed.    Significant Imaging: I have reviewed all pertinent imaging results/findings within the past 24 hours.

## 2024-10-03 NOTE — TELEMEDICINE CONSULT
[facility]  Vascular Neurology  Comprehensive Stroke Center  TeleVascular Neurology Acute Consultation Note        Consult Information  Consults    Consulting Provider: KRISTY TUCKER   Current Providers  No providers found    Patient Location:  [facility] EMERGENCY DEPARTMENT Emergency Department    Spoke hospital nurse at bedside with patient assisting consultant.  Patient information was obtained from patient, past medical records, and primary team.       Stroke Documentation  Acute Stroke Times   Last Known Normal Date: 10/03/24  Last Known Normal Time: 1030  Symptom Onset Date: 10/03/24  Symptom Onset Time: 1030  Stroke Team Called Date: 10/03/24  Stroke Team Called Time: 1113  Stroke Team Arrival Date: 10/03/24  Stroke Team Arrival Time: 1118  CT Interpretation Time: 1135  Thrombolytic Therapy Recommended: No  CTA Interpretation Time: 1125  Thrombectomy Recommended: No    NIH Scale:  Interval: baseline  1a. Level of Consciousness: 1-->Not alert, but arousable by minor stimulation to obey, answer, or respond  1b. LOC Questions: 0-->Answers both questions correctly  1c. LOC Commands: 0-->Performs both tasks correctly  2. Best Gaze: 0-->Normal  3. Visual: 0-->No visual loss  4. Facial Palsy: 0-->Normal symmetrical movements  5a. Motor Arm, Left: 2-->Some effort against gravity, limb cannot get to or maintain (if cued) 90 (or 45) degrees, drifts down to bed, but has some effort against gravity  5b. Motor Arm, Right: 2-->Some effort against gravity, limb cannot get to or maintain (if cued) 90 (or 45) degrees, drifts down to bed, but has some effort against gravity  6a. Motor Leg, Left: 0-->No drift, leg holds 30 degree position for full 5 secs  6b. Motor Leg, Right: 0-->No drift, leg holds 30 degree position for full 5 secs  7. Limb Ataxia: 0-->Absent  8. Sensory: 0-->Normal, no sensory loss  9. Best Language: 0-->No aphasia, normal  10. Dysarthria: 0-->Normal  11. Extinction and Inattention  (formerly Neglect): 0-->No abnormality  Total (NIH Stroke Scale): 5      Modified Pepito: Score: 0  Jose Manuel Coma Scale: 15   ABCD2 Score:    [id number] Score:    HAS -BLED Score:    ICH Score:    Hunt & Kemp Classification:      Blood pressure (!) 144/76, pulse (!) 55, resp. rate 18, last menstrual period 09/03/2024, SpO2 100%.      In my opinion, this was a: Tier 1; VAN Stroke Assessment: Negative     Medical Decision Making  HPI:  44 y.o. female with reported history of TIA and PFO who underwent elective laparoscopic laparoscopic salpingectomy earlier today, woke up normal after anesthesia but subsequently became altered, globally weak, and no able to talk.  Improving.     Images personally reviewed and interpreted:  Study: Head CT and CTA Head & Neck  Study Interpretation: no acute abnormality.  No LVO, high grade stenosis, or significant carotid disease     Assessment and plan:  AMS with weakness more localized to the BUE and trouble speaking.  Low index of suspicion for an acute neurovascular insult.   Recommended MRI brain to better characterize her symptoms.     Lytics recommendation: Thrombolytic therapy not recommended due to Suspected stroke mimic   Thrombectomy recommendation: No; No large vessel occlusion identified on imaging   Placement recommendation: pending further studies               ROS  Physical Exam  Past Medical History:   Diagnosis Date    Obesity, unspecified     TIA (transient ischemic attack)      No past surgical history on file.  Family History   Problem Relation Name Age of Onset    Breast cancer Maternal Aunt      Breast cancer Maternal Grandmother         Diagnoses  Problem Noted   Altered Mental Status 10/3/2024       Jarrett Dawson MD      Emergent/Acute neurological consultation requested by spoke provider due to critical concerns for possible cerebrovascular event that could result in permanent loss of neurologic/bodily function, severe disability or death of this  patient.  Immediate/timely evaluation by a highly prepared expert is paramount for optimal outcomes  High risk for neurological deterioration if not properly diagnosed  High risk for neurological deterioration if not treated promplty/as soon as possible  Complex diagnostic evaluation may be required (advanced imaging)  High risk treatment options (thrombolytics and/or thrombectomy)    Patient care was coordinated with spoke provider, including but not limted to    Discussing likely diagnosis/etiology of symptoms  Making recommendations for further diagnostic studies  Making recommendations for intravenous thrombolytics or other advanced therapies  Making recommendations for disposition (admission/transfer for higher level of care)

## 2024-10-03 NOTE — NURSING
[facility] [place]  Nurses Note -- 4 Eyes      10/3/2024       Skin assessed on: Q Shift      [x] No Pressure Injuries Present    [x]Prevention Measures Documented    [] Yes LDA  for Pressure Injury Previously documented     [] Yes New Pressure Injury Discovered   [] LDA for New Pressure Injury Added      Attending RN:  Marcy Monsivais RN     Second RN:  KACI Salazar

## 2024-10-03 NOTE — NURSING
Patient arrived to floor via stretcher with transport and .  Patient was transferred to bed with assistance. Patient was alert and oriented times 3 and voiced c/o of pain and discomfort.  Assessed surgical sites noted  three closed surgical incisions to abdomen.  Patient states pain on scale of 1-10 is an eight.

## 2024-10-03 NOTE — TRANSFER OF CARE
Anesthesia Transfer of Care Note    Patient: Flakita Douglas    Procedure(s) Performed: Procedure(s) (LRB):  SALPINGECTOMY, LAPAROSCOPIC (Bilateral)    Patient location: PACU    Anesthesia Type: general    Transport from OR: Transported from OR on 100% O2 by closed face mask with adequate spontaneous ventilation    Post pain: adequate analgesia    Post assessment: no apparent anesthetic complications and tolerated procedure well    Post vital signs: stable    Level of consciousness: sedated and responds to stimulation    Nausea/Vomiting: no nausea/vomiting    Complications: none    Transfer of care protocol was followed    Last vitals: Visit Vitals  /63 (BP Location: Right arm)   Pulse 69   Temp 36.4 °C (97.5 °F) (Temporal)   Resp 15   Wt 89.4 kg (197 lb 2.4 oz)   LMP 09/03/2024 (Exact Date)   SpO2 100%   Breastfeeding No   BMI 31.82 kg/m²

## 2024-10-03 NOTE — ANESTHESIA PROCEDURE NOTES
Intubation    Date/Time: 10/3/2024 9:07 AM    Performed by: Connor Llanso CRNA  Authorized by: Stacy Messina MD    Intubation:     Induction:  Inhalational - mask    Intubated:  Postinduction    Mask Ventilation:  Easy with oral airway    Attempts:  1    Attempted By:  Student (LIZY Spears, [facility])    Method of Intubation:  Video laryngoscopy    Blade:  Butt 3    Laryngeal View Grade: Grade I - full view of cords      Difficult Airway Encountered?: No      Complications:  None    Airway Device:  Oral endotracheal tube    Airway Device Size:  7.0    Style/Cuff Inflation:  Cuffed (inflated to minimal occlusive pressure)    Tube secured:  20    Secured at:  The teeth    Placement Verified By:  Capnometry    Complicating Factors:  None    Findings Post-Intubation:  BS equal bilateral and atraumatic/condition of teeth unchanged

## 2024-10-04 PROBLEM — R40.4 TRANSIENT ALTERATION OF AWARENESS: Status: ACTIVE | Noted: 2024-10-03

## 2024-10-04 PROCEDURE — 63600175 PHARM REV CODE 636 W HCPCS: Performed by: HOSPITALIST

## 2024-10-04 PROCEDURE — 97161 PT EVAL LOW COMPLEX 20 MIN: CPT

## 2024-10-04 PROCEDURE — 11000001 HC ACUTE MED/SURG PRIVATE ROOM

## 2024-10-04 PROCEDURE — 97530 THERAPEUTIC ACTIVITIES: CPT

## 2024-10-04 PROCEDURE — 95816 EEG AWAKE AND DROWSY: CPT | Mod: 26,,, | Performed by: INTERNAL MEDICINE

## 2024-10-04 PROCEDURE — 51798 US URINE CAPACITY MEASURE: CPT

## 2024-10-04 PROCEDURE — 97165 OT EVAL LOW COMPLEX 30 MIN: CPT

## 2024-10-04 PROCEDURE — 99223 1ST HOSP IP/OBS HIGH 75: CPT | Mod: ,,, | Performed by: STUDENT IN AN ORGANIZED HEALTH CARE EDUCATION/TRAINING PROGRAM

## 2024-10-04 PROCEDURE — 95816 EEG AWAKE AND DROWSY: CPT

## 2024-10-04 PROCEDURE — 25000003 PHARM REV CODE 250: Performed by: HOSPITALIST

## 2024-10-04 PROCEDURE — 97535 SELF CARE MNGMENT TRAINING: CPT

## 2024-10-04 PROCEDURE — 92610 EVALUATE SWALLOWING FUNCTION: CPT

## 2024-10-04 RX ADMIN — ASPIRIN 300 MG: 300 SUPPOSITORY RECTAL at 10:10

## 2024-10-04 RX ADMIN — ENOXAPARIN SODIUM 40 MG: 40 INJECTION SUBCUTANEOUS at 06:10

## 2024-10-04 RX ADMIN — KETOROLAC TROMETHAMINE 15 MG: 30 INJECTION, SOLUTION INTRAMUSCULAR; INTRAVENOUS at 06:10

## 2024-10-04 RX ADMIN — KETOROLAC TROMETHAMINE 15 MG: 30 INJECTION, SOLUTION INTRAMUSCULAR; INTRAVENOUS at 05:10

## 2024-10-04 RX ADMIN — KETOROLAC TROMETHAMINE 15 MG: 30 INJECTION, SOLUTION INTRAMUSCULAR; INTRAVENOUS at 10:10

## 2024-10-04 NOTE — NURSING
PER handoff received from Rolando Pt laying in bed awake. NAD noted. No C/O pain.  Fall and safety precaution maintained. Bed alarm activated and audible.Bed loacked in lowest position, with side rails up x2. Call bell and personal items within reach.

## 2024-10-04 NOTE — PLAN OF CARE
Problem: Physical Therapy  Goal: Physical Therapy Goal  Description: Goals to be met by: 10/18/24     Patient will increase functional independence with mobility by performin. Supine to sit with Modified [place]  2. Rolling to Left and Right with Modified [place]  3. Sit to stand transfer with Modified [place]  4. Bed to chair transfer with Modified [place]   5. Gait x250 feet with Modified [place] with or without Rolling Walker  6. Lower extremity exercise program 2 sets x15 reps per handout, with independence    Outcome: Progressing     Pt with limited ambulation 2* abdominal pain.

## 2024-10-04 NOTE — PLAN OF CARE
Problem: Adult Inpatient Plan of Care  Goal: Optimal Comfort and Wellbeing  Intervention: Monitor Pain and Promote Comfort  Flowsheets (Taken 10/4/2024 0300)  Pain Management Interventions:   care clustered   quiet environment facilitated   warm blanket provided     Problem: Fall Injury Risk  Goal: Absence of Fall and Fall-Related Injury  Intervention: Promote Injury-Free Environment  Flowsheets (Taken 10/4/2024 0300)  Safety Promotion/Fall Prevention:   assistive device/personal item within reach   bed alarm set   family to remain at bedside   medications reviewed   room near unit station   side rails raised x 2     Bladder scan shows 424 ml, patient refused straight cath

## 2024-10-04 NOTE — CONSULTS
[facility] - [place] - Telemetry  Neurology  Consult Note    Patient Name: Flakita Douglas  MRN: [id number]  Admission Date: 10/3/2024  Hospital Length of Stay: 1 days  Code Status: Full Code   Attending Provider: Pepper Robison, Sreedhar   Consulting Provider: Rishi Jackson MD  Primary Care Physician: Ivet Marvin MD  Principal Problem:Altered mental status    Inpatient consult to Neurology  Consult performed by: Rishi Jackson MD  Consult ordered by: Jeane Madden MD         Subjective:     Chief Complaint:  Encephalopathy      HPI:   44 yr old female with medical history of migraines with admission concerns of laparoscopic salpingectomy and IUD removal. Post op course complicated by transient encephalopathy / and neurology has been consulted for the same.     History from patient / medical records review. Event of starring spell during the post operative recovery, with reduced verbal expression, with associated generalized weakness and reduced voluntary movements of extremities; with slow gradual improvement in overall symptomatology. No report of GTC event, with obvious post ictal confusion. No associated loss of bladder continence / tongue biting. No prodromal symptoms. No clear triggers. No immediate correlating risks. No substance abuse. No history of epilepsy. No family history of epilepsy. No history of strokes or complex migraines.      Pertinent history similar events in the past - two events 2023 & 7/2024 / more in spectrum of reduced verbal expression / difficulty in articulation - stuttering phenomenon - with gradual improvement in symptoms over the days; with fluctuations through the course. Neuroimaging's during the time have been unrevealing     Currently awake, following commands, reduced verbal expression - stuttering; excessive variability of presentation; struggling behaviors; delayed voluntary movements in extremities. No focal motor or cranial nerve deficits  Labs no elevated  WBC - otherwise unremarkable.   MRI brain / CTA head and neck - unrevealing for any acute pathology      Past Medical History:   Diagnosis Date    Obesity, unspecified     PFO (patent foramen ovale)     Stroke-like symptoms     Hx of AMS & difficulty speaking,    TIA (transient ischemic attack)        Past Surgical History:   Procedure Laterality Date    REMOVAL OF INTRAUTERINE DEVICE (IUD)  10/03/2024    SALPINGECTOMY Bilateral 10/03/2024       Review of patient's allergies indicates:   Allergen Reactions    Pcn [penicillins]        Current Neurological Medications:     Current Facility-Administered Medications on File Prior to Encounter   Medication    [DISCONTINUED] 0.9%  NaCl infusion    [DISCONTINUED] diphenhydrAMINE injection 25 mg    [DISCONTINUED] famotidine tablet 20 mg    [DISCONTINUED] HYDROmorphone (PF) injection 0.2 mg    [DISCONTINUED] HYDROmorphone (PF) injection 0.2 mg    [DISCONTINUED] lactated ringers infusion    [DISCONTINUED] LIDOcaine (PF) 10 mg/ml (1%) injection 10 mg    [DISCONTINUED] LORazepam injection 0.25 mg    [DISCONTINUED] meperidine injection 12.5 mg    [DISCONTINUED] mupirocin 2 % ointment    [DISCONTINUED] ondansetron disintegrating tablet 8 mg     Current Outpatient Medications on File Prior to Encounter   Medication Sig    HYDROcodone-acetaminophen (NORCO) 5-325 mg per tablet Take 1 tablet by mouth every 6 (six) hours as needed for Pain.    ibuprofen (ADVIL,MOTRIN) 800 MG tablet Take 1 tablet (800 mg total) by mouth every 8 (eight) hours as needed for Pain.    rimegepant (NURTEC) 75 mg odt Take 75 mg by mouth daily as needed.     Family History       Problem Relation (Age of Onset)    Breast cancer Maternal Aunt, Maternal Grandmother          Tobacco Use    Smoking status: Never    Smokeless tobacco: Never   Substance and Sexual Activity    Alcohol use: Yes     Comment: socially    Drug use: Never    Sexual activity: Yes     Partners: Male     Review of Systems   Constitutional:   "Negative for chills and fever.   Respiratory:  Negative for shortness of breath.    Cardiovascular:  Negative for chest pain.   Neurological:  Positive for weakness. Negative for facial asymmetry and headaches.   Psychiatric/Behavioral:  Positive for decreased concentration. Negative for agitation.      Objective:     Vital Signs (Most Recent):  Temp: 98.6 °F (37 °C) (10/04/24 0744)  Pulse: 60 (10/04/24 0744)  Resp: 18 (10/04/24 0744)  BP: 117/66 (10/04/24 0744)  SpO2: 98 % (10/04/24 0744) Vital Signs (24h Range):  Temp:  [97.8 °F (36.6 °C)-98.6 °F (37 °C)] 98.6 °F (37 °C)  Pulse:  [55-76] 60  Resp:  [13-32] 18  SpO2:  [95 %-100 %] 98 %  BP: ()/(56-87) 117/66     Weight: 88.3 kg (194 lb 10.7 oz)  Body mass index is 31.42 kg/m².     Physical Exam  HENT:      Head: Normocephalic and atraumatic.   Eyes:      Extraocular Movements: Extraocular movements intact and EOM normal.   Pulmonary:      Effort: No respiratory distress.   Neurological:      Mental Status: She is alert.   Psychiatric:         Speech: Speech is slurred.          NEUROLOGICAL EXAMINATION:     MENTAL STATUS   Oriented to person.   Attention: decreased. Concentration: decreased.   Speech: slurred   Normal comprehension.     CRANIAL NERVES     CN III, IV, VI   Extraocular motions are normal.   Nystagmus: none   Ophthalmoparesis: none    CN VII   Facial expression full, symmetric.     MOTOR EXAM        No new focal motor deficits       SENSORY EXAM        No new focal sensory deficits       GAIT AND COORDINATION     Tremor   Resting tremor: absent      Significant Labs: Hemoglobin A1c: No results for input(s): "HGBA1C" in the last 720 hours.  Blood Culture: No results for input(s): "LABBLOO" in the last 48 hours.  BMP:   Recent Labs   Lab 10/03/24  1126         K 5.0      CO2 22*   BUN 13   CREATININE 0.8   CALCIUM 8.8     CBC:   Recent Labs   Lab 10/03/24  1114 10/03/24  1126   WBC  --  7.89   HGB  --  12.0   HCT 38 38.1   PLT  " "--  279     CMP:   Recent Labs   Lab 10/03/24  1126         K 5.0      CO2 22*   BUN 13   CREATININE 0.8   CALCIUM 8.8   PROT 7.1   ALBUMIN 3.6   BILITOT 1.1*   ALKPHOS 36*   AST 27   ALT 14   ANIONGAP 7*     CSF Culture: No results for input(s): "CSFCULTURE" in the last 48 hours.  CSF Studies: No results for input(s): "ALIQUT", "APPEARCSF", "COLORCSF", "CSFWBC", "CSFRBC", "GLUCCSF", "LDHCSF", "PROTEINCSF", "VDRLCSF" in the last 48 hours.  Inflammatory Markers: No results for input(s): "SEDRATE", "CRP", "PROCAL" in the last 48 hours.  POCT Glucose: No results for input(s): "POCTGLUCOSE" in the last 24 hours.  Prealbumin: No results for input(s): "PREALBUMIN" in the last 48 hours.  Respiratory Culture: No results for input(s): "GSRESP", "RESPIRATORYC" in the last 48 hours.  Urine Culture: No results for input(s): "LABURIN" in the last 48 hours.  Urine Studies: No results for input(s): "COLORU", "APPEARANCEUA", "PHUR", "SPECGRAV", "PROTEINUA", "GLUCUA", "KETONESU", "BILIRUBINUA", "OCCULTUA", "NITRITE", "UROBILINOGEN", "LEUKOCYTESUR", "RBCUA", "WBCUA", "BACTERIA", "SQUAMEPITHEL", "HYALINECASTS" in the last 48 hours.    Invalid input(s): "WRIGHTSUR"  Recent Lab Results         10/03/24  1348   10/03/24  1126   10/03/24  1114        A4C EF 65           Albumin   3.6         ALP   36         Allens Test     N/A       ALT   14         Anion Gap   7         Ascending aorta 3.22           Ao peak teodoro 1.2           Ao VTI 23.5           AST   27         AV valve area 3.3           DAVID by Velocity Ratio 3.2           AV mean gradient 3.2           AV index (prosthetic) 0.94           AV peak gradient 5.8           AV Velocity Ratio 0.92           Baso #   0.08         Basophil %   1.0         BILIRUBIN TOTAL   1.1  Comment: For infants and newborns, interpretation of results should be based  on gestational age, weight and in agreement with clinical  observations.    Premature Infant recommended reference " ranges:  Up to 24 hours.............<8.0 mg/dL  Up to 48 hours............<12.0 mg/dL  3-5 days..................<15.0 mg/dL  6-29 days.................<15.0 mg/dL           Site     Other       BUN   13         Calcium   8.8         Chloride   109         Cholesterol Total   166  Comment: The National Cholesterol Education Program (NCEP) has set the  following guidelines (reference ranges) for Cholesterol:  Optimal.....................<200 mg/dL  Borderline High.............200-239 mg/dL  High........................> or = 240 mg/dL              166  Comment: The National Cholesterol Education Program (NCEP) has set the  following guidelines (reference ranges) for Cholesterol:  Optimal.....................<200 mg/dL  Borderline High.............200-239 mg/dL  High........................> or = 240 mg/dL           CO2   22         Creatinine   0.8         Left Ventricle Relative Wall Thickness 0.39           Differential Method   Automated         E/A ratio 1.19           E/E' ratio 8.42           eGFR   >60         Eos #   0.2         Eos %   2.5         E wave deceleration time 224.62           FS 33.3           Glucose   108         Gran # (ANC)   5.5         Gran %   69.1         HDL   42  Comment: The National Cholesterol Education Program (NCEP) has set the  following guidelines (reference values) for HDL Cholesterol:  Low...............<40 mg/dL  Optimal...........>60 mg/dL              42  Comment: The National Cholesterol Education Program (NCEP) has set the  following guidelines (reference values) for HDL Cholesterol:  Low...............<40 mg/dL  Optimal...........>60 mg/dL           HDL/Cholesterol Ratio   25.3            25.3         Hematocrit   38.1         Hemoglobin   12.0         Immature Grans (Abs)   0.03  Comment: Mild elevation in immature granulocytes is non specific and   can be seen in a variety of conditions including stress response,   acute inflammation, trauma and pregnancy. Correlation  with other   laboratory and clinical findings is essential.           Immature Granulocytes   0.4         INR   1.1  Comment: Coumadin Therapy:  2.0 - 3.0 for INR for all indicators except mechanical heart valves  and antiphospholipid syndromes which should use 2.5 - 3.5.              1.1  Comment: Coumadin Therapy:  2.0 - 3.0 for INR for all indicators except mechanical heart valves  and antiphospholipid syndromes which should use 2.5 - 3.5.           IVC diameter 1.23           IVSd 1.1           LA WIDTH 3.9           Left Atrium Major Axis 4.23           Left Atrium Minor Axis 4.65           LA size 3.60           LA Vol 52.87           LVOT area 3.5           LDL Cholesterol   107.4  Comment: The National Cholesterol Education Program (NCEP) has set the  following guidelines (reference values) for LDL Cholesterol:  Optimal.......................<130 mg/dL  Borderline High...............130-159 mg/dL  High..........................160-189 mg/dL  Very High.....................>190 mg/dL              107.4  Comment: The National Cholesterol Education Program (NCEP) has set the  following guidelines (reference values) for LDL Cholesterol:  Optimal.......................<130 mg/dL  Borderline High...............130-159 mg/dL  High..........................160-189 mg/dL  Very High.....................>190 mg/dL           LV LATERAL E/E' RATIO 7.27           LV SEPTAL E/E' RATIO 10.00           LV EDV .40           Left Ventricular End Diastolic Volume by Teichholz Method 124.40           LV EDV A4C 80.65           Left Ventricular End Systolic Volume by Teichholz Method 48.96           LVIDd 5.1           LVIDs 3.4           LV mass 200.8           Left Ventricular Outflow Tract Mean Gradient 2.56           Left Ventricular Outflow Tract Mean Velocity 0.76           LVOT diameter 2.1           LVOT peak teodoro 1.1           LVOT stroke volume 76.9           LVOT peak VTI 22.2           LV ESV BP 48.96            Lymph #   1.9         Lymph %   24.1         MCH   29.3         MCHC   31.5         MCV   93         Mean e' 0.10           Mono #   0.2         Mono %   2.9         MPV   9.9         MV valve area p 1/2 method 3.38           MV Peak A Leonardo 0.67           MV Peak E Leonardo 0.80           MV stenosis pressure 1/2 time 65.14           Non-HDL Cholesterol   124  Comment: Risk category and Non-HDL cholesterol goals:  Coronary heart disease (CHD)or equivalent (10-year risk of CHD >20%):  Non-HDL cholesterol goal     <130 mg/dL  Two or more CHD risk factors and 10-year risk of CHD <= 20%:  Non-HDL cholesterol goal     <160 mg/dL  0 to 1 CHD risk factor:  Non-HDL cholesterol goal     <190 mg/dL              124  Comment: Risk category and Non-HDL cholesterol goals:  Coronary heart disease (CHD)or equivalent (10-year risk of CHD >20%):  Non-HDL cholesterol goal     <130 mg/dL  Two or more CHD risk factors and 10-year risk of CHD <= 20%:  Non-HDL cholesterol goal     <160 mg/dL  0 to 1 CHD risk factor:  Non-HDL cholesterol goal     <190 mg/dL           nRBC   0         Platelet Count   279         POC Anion Gap     14       POC BUN     14       POC Chloride     105       POC Creatinine     0.8       POC Glucose     111       POC Hematocrit     38       POC Ionized Calcium     1.27       POC Potassium     4.4       POC Sodium     139       POC TCO2 (MEASURED)     26       Potassium   5.0  Comment: Specimen moderately hemolyzed         PROTEIN TOTAL   7.1         PT   11.8            11.8         PV peak gradient 3           PV PEAK VELOCITY 0.93           PW 1.0           RA Major Axis 3.29           Est. RA pres 3           RA Width 3.0           RBC   4.09         RDW   12.4         RV S' 16.35           RV TB RVSP 5           RV/LV Ratio 0.69           RVDD 3.52           RV- cordero basal diam 3.5           Sample     VENOUS       Sinus 3.07           Sodium   138         STJ 3.10           TAPSE 2.21           TDI SEPTAL 0.08            TDI LATERAL 0.11           Total Cholesterol/HDL Ratio   4.0            4.0         Triglycerides   83  Comment: The National Cholesterol Education Program (NCEP) has set the  following guidelines (reference values) for triglycerides:  Normal......................<150 mg/dL  Borderline High.............150-199 mg/dL  High........................200-499 mg/dL              83  Comment: The National Cholesterol Education Program (NCEP) has set the  following guidelines (reference values) for triglycerides:  Normal......................<150 mg/dL  Borderline High.............150-199 mg/dL  High........................200-499 mg/dL           Triscuspid Valve Regurgitation Peak Gradient 12           TR Max Leonardo 1.72           TSH   0.826            0.826         TV resting pulmonary artery pressure 15           WBC   7.89               All pertinent lab results from the past 24 hours have been reviewed.    Significant Imaging: I have reviewed and interpreted all pertinent imaging results/findings within the past 24 hours.  Assessment and Plan:     * Altered mental status  44 yr old female with medical history of migraines with admission concerns of laparoscopic salpingectomy and IUD removal. Post op course complicated by transient encephalopathy / and neurology has been consulted for the same.     History from patient / medical records review. Event of starring spell during the post operative recovery, with reduced verbal expression, with associated generalized weakness and reduced voluntary movements of extremities; with slow gradual improvement in overall symptomatology. No report of GTC event, with obvious post ictal confusion. No associated loss of bladder continence / tongue biting. No prodromal symptoms. No clear triggers. No immediate correlating risks. No substance abuse. No history of epilepsy. No family history of epilepsy. No history of strokes or complex migraines.      Pertinent history similar events  in the past - two events 2023 & 7/2024 / more in spectrum of reduced verbal expression / difficulty in articulation - stuttering phenomenon - with gradual improvement in symptoms over the days; with fluctuations through the course. Neuroimaging's during the time have been unrevealing. However denied any significant mood disorder     Currently awake, following commands, reduced verbal expression - stuttering; excessive variability of presentation; struggling behaviors; delayed voluntary movements in extremities. No focal motor or cranial nerve deficits  Labs no elevated WBC - otherwise unremarkable.   MRI brain / CTA head and neck - unrevealing for any acute pathology     Recs:  Encephalopathy - transient / fluctuating in the spectrum of starring spell, with reduced verbal expression, with generalized weakness and reduced voluntary movements of extremities. 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 Low suspicion for focal cerebrovascular ischemic event or epileptic or neuro inflammatory etiology - however shall rule out      F/u EEG / If negative - no active interventions or investigations needed from neuro standpoint     - Prn ativan 1-2 mg IV if GTC or focal complex with secondary generalization > 1 mins / repeat ativan every 2 mins to .1mg/kg for uncontrolled seizures    Encephalopathy evaluation and management  -- Correct lytes as needed / investigate and control infection if any / avoid hypoxia or hypercapnia / avoid hypoglycemia / control uremia - avoid rapid correction / avoid-correct metabolic-respiratory acidosis or alkalosis   -- Correct any nutritional deficiencies / correct anemia / provide nutritional support as appropriate / ambulate when appropriate   -- PT/OT evaluation and management   -- delirium precautions      Consider OP neuropsychology evaluation   PRN OP neurology         VTE Risk Mitigation (From admission, onward)            Ordered     enoxaparin injection 40 mg  Daily         10/03/24 1303     IP VTE HIGH RISK PATIENT  Once         10/03/24 1303     Place CODI hose  Until discontinued         10/03/24 1303     Place sequential compression device  Until discontinued         10/03/24 1303                    Thank you for your consult.     Rishi Jackson MD  Neurology  [facility] - [place] - Telemetry

## 2024-10-04 NOTE — NURSING
[facility], [facility]  Nurses Note -- 4 Eyes      10/4/2024       Skin assessed on: Q Shift      [x] No Pressure Injuries Present    []Prevention Measures Documented    [] Yes LDA  for Pressure Injury Previously documented     [] Yes New Pressure Injury Discovered   [] LDA for New Pressure Injury Added      Attending RN:  Rolando Rogel RN     Second RN:  KACI Gould

## 2024-10-04 NOTE — PLAN OF CARE
Problem: Adult Inpatient Plan of Care  Goal: Plan of Care Review  Outcome: Progressing  Goal: Patient-Specific Goal (Individualized)  Outcome: Progressing  Goal: Absence of Hospital-Acquired Illness or Injury  Outcome: Progressing  Goal: Optimal Comfort and Wellbeing  Outcome: Progressing  Goal: Readiness for Transition of Care  Outcome: Progressing     Problem: Infection  Goal: Absence of Infection Signs and Symptoms  Outcome: Progressing     Problem: Wound  Goal: Optimal Coping  Outcome: Progressing  Goal: Optimal Functional Ability  Outcome: Progressing  Goal: Absence of Infection Signs and Symptoms  Outcome: Progressing  Goal: Improved Oral Intake  Outcome: Progressing  Goal: Optimal Pain Control and Function  Outcome: Progressing  Goal: Skin Health and Integrity  Outcome: Progressing  Goal: Optimal Wound Healing  Outcome: Progressing     Problem: Skin Injury Risk Increased  Goal: Skin Health and Integrity  Outcome: Progressing     Problem: Fall Injury Risk  Goal: Absence of Fall and Fall-Related Injury  Outcome: Progressing      Dr. Jaquez office calling     Was told that  missed a call and was trying to call back but got automated system     Can be reached at [phone]

## 2024-10-04 NOTE — ASSESSMENT & PLAN NOTE
Acute onset aphasia after having recovered completely from surgery. CTH, CTA no CVA/LVO  - check MRI brain  - NPO for now- rectal asa given  - TTE with bubble study  - apparently there is a family history of clotting disorder- need to parse this out further with family when they are able  - monitor on tele  - PT, OT, ST consults- can't participate now, likely tomorrow  - Neuro consult ,  CTA head,neck  and MRI show no acute process,she is alert time 3 at this time,neurology is following and patient will have EEG today.

## 2024-10-04 NOTE — PT/OT/SLP EVAL
Occupational Therapy Evaluation     Name: Flakita Douglas  MRN: [id number]  Admitting Diagnosis: Transient alteration of awareness  Recent Surgery: * No surgery found *      Recommendations:     Discharge Recommendations: Low Intensity Therapy  Level of Assistance Recommended: Intermittent assistance and Intermittent supervision  Discharge Equipment Recommendations: walker, rolling, shower chair  Barriers to discharge: None    Assessment:     Flakita Douglas is a 44 y.o. female with a medical diagnosis of Transient alteration of awareness. She presents with performance deficits affecting function including weakness, impaired endurance, impaired self care skills, impaired functional mobility, gait instability, impaired balance, decreased safety awareness, impaired cardiopulmonary response to activity, orthopedic precautions. Patient was lethargic throughout session, but cooperative with mother present providing encouragement. She demonstrated log rolling getting in and out of bed and said that PT had shown her how to do precautions safely.     Rehab Prognosis: Good; patient would benefit from acute OT services to address these deficits and reach maximum level of function.    Plan:     Patient to be seen 2 x/week to address the above listed problems via self-care/home management, therapeutic activities, therapeutic exercises  Plan of Care Expires: 10/11/24  Plan of Care Reviewed with: patient, mother    Subjective     Chief Complaint: abdominal pain   Patient Comments/Goals: return home with mom   Pain/Comfort:  Pain Rating 1: other (see comments) (unrated;  Pt c/o abdominal pain.)  Location - Orientation 1: generalized  Location 1: abdomen    Patients cultural, spiritual, Faith conflicts given the current situation: no    Social History:  Living Environment: Patient lives with their child(margret) and mother in a single story home with number of outside stair(s): 2   Prior Level of Function: Prior to admission,  patient was independent  Roles and Routines: Patient was driving prior to admission.  Equipment Used at Home: none  DME owned (not currently used): none  Assistance Upon Discharge: family    Objective:     Communicated with RN  prior to session. Patient found HOB elevated with peripheral IV, pulse ox (continuous), telemetry upon OT entry to room.    General Precautions: Standard, fall   Orthopedic Precautions: N/A   Braces: N/A    Respiratory Status: Room air    Occupational Performance    Gait belt applied - No    Bed Mobility:   Rolling/Turning to Right with contact guard assistance  Scooting anteriorly to EOB to have both feet planted on floor: contact guard assistance  Supine to sit from right side of bed with contact guard assistance  Sit to Supine with stand by assistance on right  side of bed due to needing to log roll for abdominal precautions     Functional Mobility/Transfers:  Sit <> Stand Transfer with contact guard assistance with hand-held assist  Functional Mobility: Patient walked from bed to sink with CGA and returned to bed with CGA with no AD.     Activities of Daily Living:  Grooming: independence standing at sink   Lower Body Dressing: minimum assistance with donning socks     Cognitive/Visual Perceptual:  Cognitive/Psychosocial Skills:    -     Oriented to: Person, Place, Time, Situation  -     Follows Commands/attention: Follows multistep  commands  -     Communication: clear/fluent  -     Memory: No Deficits noted  -     Safety awareness/insight to disability: intact  -     Mood/Affect/Coping skills/emotional control: Appropriate to situation  Visual/Perceptual:    -     Intact    Physical Exam:  Balance:    -     Sitting: stand by assistance  -     Standing: contact guard assistance  Postural examination/scapula alignment:    -       Rounded shoulders  -       Forward head  Skin integrity: Visible skin intact  Edema:  None noted  Sensation:    -       Intact  Motor Planning: Intact  Dominant  hand: Right  Upper Extremity Range of Motion:     -       Right Upper Extremity: WFL  -       Left Upper Extremity: WFL  Upper Extremity Strength:    -       Right Upper Extremity: WFL  -       Left Upper Extremity: WFL   Strength:    -       Right Upper Extremity: WNL  -       Left Upper Extremity: WNL  Fine Motor Coordination:    -       Intact  Gross motor coordination:   WFL    AMPAC 6 Click ADL:  AMPAC Total Score: 19    Treatment & Education:  Patient educated on role of OT, POC, and goals for therapy  Patient educated on importance of OOB activities with staff member assistance and sitting OOB majority of the day  Occupational therapy educated patient: re: abdominal precautions including no heavy lifting, no resistive exercises, and no excessive bending     Patient clear to ambulate to/from bathroom with RN/PCT, assist x1  .    Patient left HOB elevated with all lines intact, call button in reach, and RN notified.    GOALS:   Multidisciplinary Problems       Occupational Therapy Goals          Problem: Occupational Therapy    Goal Priority Disciplines Outcome Interventions   Occupational Therapy Goal     OT, PT/OT Progressing    Description: Goals to be met by: 10/11/24      Patient will increase functional independence with ADLs by performing:    UE Dressing with [place].  LE Dressing with [place].  Grooming while standing at sink with [place].  Toileting from toilet with Modified [place] for hygiene and clothing management.   Sitting in chair x60  minutes with Modified [place].  Toilet transfer to toilet with Modified [place].  Upper extremity exercise program x10  reps per handout, with supervision due to abdominal precautions such as no heavy lifting or heavy resistive exercises .                         History:     Past Medical History:   Diagnosis Date    Obesity, unspecified     PFO (patent foramen ovale)     Stroke-like symptoms     Hx of AMS & difficulty speaking,     TIA (transient ischemic attack)          Past Surgical History:   Procedure Laterality Date    LAPAROSCOPIC SALPINGECTOMY Bilateral 10/3/2024    Procedure: SALPINGECTOMY, LAPAROSCOPIC;  Surgeon: Lena Angela MD;  Location: [facility] OR;  Service: OB/GYN;  Laterality: Bilateral;  RN PREOP 9/25/2024---T/S done----UPT NEG ON 10/1---NEED ORDERS    REMOVAL OF INTRAUTERINE DEVICE (IUD)  10/03/2024    REMOVAL OF INTRAUTERINE DEVICE (IUD) N/A 10/3/2024    Procedure: REMOVAL, INTRAUTERINE DEVICE;  Surgeon: Lena Angela MD;  Location: [facility] OR;  Service: OB/GYN;  Laterality: N/A;    SALPINGECTOMY Bilateral 10/03/2024       Time Tracking:     OT Date of Treatment: 10/04/24  OT Start Time: 1530  OT Stop Time: 1600  OT Total Time (min): 30 min    Billable Minutes: Evaluation 15  and Self Care/Home Management 15     10/4/2024

## 2024-10-04 NOTE — HPI
44 yr old female with medical history of migraines with admission concerns of laparoscopic salpingectomy and IUD removal. Post op course complicated by transient encephalopathy / and neurology has been consulted for the same.     History from patient / medical records review. Event of starring spell during the post operative recovery, with reduced verbal expression, with associated generalized weakness and reduced voluntary movements of extremities; with slow gradual improvement in overall symptomatology. No report of GTC event, with obvious post ictal confusion. No associated loss of bladder continence / tongue biting. No prodromal symptoms. No clear triggers. No immediate correlating risks. No substance abuse. No history of epilepsy. No family history of epilepsy. No history of strokes or complex migraines.      Pertinent history similar events in the past - two events 2023 & 7/2024 / more in spectrum of reduced verbal expression / difficulty in articulation - stuttering phenomenon - with gradual improvement in symptoms over the days; with fluctuations through the course. Neuroimaging's during the time have been unrevealing     Currently awake, following commands, reduced verbal expression - stuttering; excessive variability of presentation; struggling behaviors; delayed voluntary movements in extremities. No focal motor or cranial nerve deficits  Labs no elevated WBC - otherwise unremarkable.   MRI brain / CTA head and neck - unrevealing for any acute pathology

## 2024-10-04 NOTE — SUBJECTIVE & OBJECTIVE
Past Medical History:   Diagnosis Date    Obesity, unspecified     PFO (patent foramen ovale)     Stroke-like symptoms     Hx of AMS & difficulty speaking,    TIA (transient ischemic attack)        Past Surgical History:   Procedure Laterality Date    REMOVAL OF INTRAUTERINE DEVICE (IUD)  10/03/2024    SALPINGECTOMY Bilateral 10/03/2024       Review of patient's allergies indicates:   Allergen Reactions    Pcn [penicillins]        Current Neurological Medications:     Current Facility-Administered Medications on File Prior to Encounter   Medication    [DISCONTINUED] 0.9%  NaCl infusion    [DISCONTINUED] diphenhydrAMINE injection 25 mg    [DISCONTINUED] famotidine tablet 20 mg    [DISCONTINUED] HYDROmorphone (PF) injection 0.2 mg    [DISCONTINUED] HYDROmorphone (PF) injection 0.2 mg    [DISCONTINUED] lactated ringers infusion    [DISCONTINUED] LIDOcaine (PF) 10 mg/ml (1%) injection 10 mg    [DISCONTINUED] LORazepam injection 0.25 mg    [DISCONTINUED] meperidine injection 12.5 mg    [DISCONTINUED] mupirocin 2 % ointment    [DISCONTINUED] ondansetron disintegrating tablet 8 mg     Current Outpatient Medications on File Prior to Encounter   Medication Sig    HYDROcodone-acetaminophen (NORCO) 5-325 mg per tablet Take 1 tablet by mouth every 6 (six) hours as needed for Pain.    ibuprofen (ADVIL,MOTRIN) 800 MG tablet Take 1 tablet (800 mg total) by mouth every 8 (eight) hours as needed for Pain.    rimegepant (NURTEC) 75 mg odt Take 75 mg by mouth daily as needed.     Family History       Problem Relation (Age of Onset)    Breast cancer Maternal Aunt, Maternal Grandmother          Tobacco Use    Smoking status: Never    Smokeless tobacco: Never   Substance and Sexual Activity    Alcohol use: Yes     Comment: socially    Drug use: Never    Sexual activity: Yes     Partners: Male     Review of Systems   Constitutional:  Negative for chills and fever.   Respiratory:  Negative for shortness of breath.    Cardiovascular:   "Negative for chest pain.   Neurological:  Positive for weakness. Negative for facial asymmetry and headaches.   Psychiatric/Behavioral:  Positive for decreased concentration. Negative for agitation.      Objective:     Vital Signs (Most Recent):  Temp: 98.6 °F (37 °C) (10/04/24 0744)  Pulse: 60 (10/04/24 0744)  Resp: 18 (10/04/24 0744)  BP: 117/66 (10/04/24 0744)  SpO2: 98 % (10/04/24 0744) Vital Signs (24h Range):  Temp:  [97.8 °F (36.6 °C)-98.6 °F (37 °C)] 98.6 °F (37 °C)  Pulse:  [55-76] 60  Resp:  [13-32] 18  SpO2:  [95 %-100 %] 98 %  BP: ()/(56-87) 117/66     Weight: 88.3 kg (194 lb 10.7 oz)  Body mass index is 31.42 kg/m².     Physical Exam  HENT:      Head: Normocephalic and atraumatic.   Eyes:      Extraocular Movements: Extraocular movements intact and EOM normal.   Pulmonary:      Effort: No respiratory distress.   Neurological:      Mental Status: She is alert.   Psychiatric:         Speech: Speech is slurred.          NEUROLOGICAL EXAMINATION:     MENTAL STATUS   Oriented to person.   Attention: decreased. Concentration: decreased.   Speech: slurred   Normal comprehension.     CRANIAL NERVES     CN III, IV, VI   Extraocular motions are normal.   Nystagmus: none   Ophthalmoparesis: none    CN VII   Facial expression full, symmetric.     MOTOR EXAM        No new focal motor deficits       SENSORY EXAM        No new focal sensory deficits       GAIT AND COORDINATION     Tremor   Resting tremor: absent      Significant Labs: Hemoglobin A1c: No results for input(s): "HGBA1C" in the last 720 hours.  Blood Culture: No results for input(s): "LABBLOO" in the last 48 hours.  BMP:   Recent Labs   Lab 10/03/24  1126         K 5.0      CO2 22*   BUN 13   CREATININE 0.8   CALCIUM 8.8     CBC:   Recent Labs   Lab 10/03/24  1114 10/03/24  1126   WBC  --  7.89   HGB  --  12.0   HCT 38 38.1   PLT  --  279     CMP:   Recent Labs   Lab 10/03/24  1126         K 5.0      CO2 22* " "  BUN 13   CREATININE 0.8   CALCIUM 8.8   PROT 7.1   ALBUMIN 3.6   BILITOT 1.1*   ALKPHOS 36*   AST 27   ALT 14   ANIONGAP 7*     CSF Culture: No results for input(s): "CSFCULTURE" in the last 48 hours.  CSF Studies: No results for input(s): "ALIQUT", "APPEARCSF", "COLORCSF", "CSFWBC", "CSFRBC", "GLUCCSF", "LDHCSF", "PROTEINCSF", "VDRLCSF" in the last 48 hours.  Inflammatory Markers: No results for input(s): "SEDRATE", "CRP", "PROCAL" in the last 48 hours.  POCT Glucose: No results for input(s): "POCTGLUCOSE" in the last 24 hours.  Prealbumin: No results for input(s): "PREALBUMIN" in the last 48 hours.  Respiratory Culture: No results for input(s): "GSRESP", "RESPIRATORYC" in the last 48 hours.  Urine Culture: No results for input(s): "LABURIN" in the last 48 hours.  Urine Studies: No results for input(s): "COLORU", "APPEARANCEUA", "PHUR", "SPECGRAV", "PROTEINUA", "GLUCUA", "KETONESU", "BILIRUBINUA", "OCCULTUA", "NITRITE", "UROBILINOGEN", "LEUKOCYTESUR", "RBCUA", "WBCUA", "BACTERIA", "SQUAMEPITHEL", "HYALINECASTS" in the last 48 hours.    Invalid input(s): "WRIGHTSUR"  Recent Lab Results         10/03/24  1348   10/03/24  1126   10/03/24  1114        A4C EF 65           Albumin   3.6         ALP   36         Allens Test     N/A       ALT   14         Anion Gap   7         Ascending aorta 3.22           Ao peak teodoro 1.2           Ao VTI 23.5           AST   27         AV valve area 3.3           DAVID by Velocity Ratio 3.2           AV mean gradient 3.2           AV index (prosthetic) 0.94           AV peak gradient 5.8           AV Velocity Ratio 0.92           Baso #   0.08         Basophil %   1.0         BILIRUBIN TOTAL   1.1  Comment: For infants and newborns, interpretation of results should be based  on gestational age, weight and in agreement with clinical  observations.    Premature Infant recommended reference ranges:  Up to 24 hours.............<8.0 mg/dL  Up to 48 hours............<12.0 mg/dL  3-5 " days..................<15.0 mg/dL  6-29 days.................<15.0 mg/dL           Site     Other       BUN   13         Calcium   8.8         Chloride   109         Cholesterol Total   166  Comment: The National Cholesterol Education Program (NCEP) has set the  following guidelines (reference ranges) for Cholesterol:  Optimal.....................<200 mg/dL  Borderline High.............200-239 mg/dL  High........................> or = 240 mg/dL              166  Comment: The National Cholesterol Education Program (NCEP) has set the  following guidelines (reference ranges) for Cholesterol:  Optimal.....................<200 mg/dL  Borderline High.............200-239 mg/dL  High........................> or = 240 mg/dL           CO2   22         Creatinine   0.8         Left Ventricle Relative Wall Thickness 0.39           Differential Method   Automated         E/A ratio 1.19           E/E' ratio 8.42           eGFR   >60         Eos #   0.2         Eos %   2.5         E wave deceleration time 224.62           FS 33.3           Glucose   108         Gran # (ANC)   5.5         Gran %   69.1         HDL   42  Comment: The National Cholesterol Education Program (NCEP) has set the  following guidelines (reference values) for HDL Cholesterol:  Low...............<40 mg/dL  Optimal...........>60 mg/dL              42  Comment: The National Cholesterol Education Program (NCEP) has set the  following guidelines (reference values) for HDL Cholesterol:  Low...............<40 mg/dL  Optimal...........>60 mg/dL           HDL/Cholesterol Ratio   25.3            25.3         Hematocrit   38.1         Hemoglobin   12.0         Immature Grans (Abs)   0.03  Comment: Mild elevation in immature granulocytes is non specific and   can be seen in a variety of conditions including stress response,   acute inflammation, trauma and pregnancy. Correlation with other   laboratory and clinical findings is essential.           Immature Granulocytes    0.4         INR   1.1  Comment: Coumadin Therapy:  2.0 - 3.0 for INR for all indicators except mechanical heart valves  and antiphospholipid syndromes which should use 2.5 - 3.5.              1.1  Comment: Coumadin Therapy:  2.0 - 3.0 for INR for all indicators except mechanical heart valves  and antiphospholipid syndromes which should use 2.5 - 3.5.           IVC diameter 1.23           IVSd 1.1           LA WIDTH 3.9           Left Atrium Major Axis 4.23           Left Atrium Minor Axis 4.65           LA size 3.60           LA Vol 52.87           LVOT area 3.5           LDL Cholesterol   107.4  Comment: The National Cholesterol Education Program (NCEP) has set the  following guidelines (reference values) for LDL Cholesterol:  Optimal.......................<130 mg/dL  Borderline High...............130-159 mg/dL  High..........................160-189 mg/dL  Very High.....................>190 mg/dL              107.4  Comment: The National Cholesterol Education Program (NCEP) has set the  following guidelines (reference values) for LDL Cholesterol:  Optimal.......................<130 mg/dL  Borderline High...............130-159 mg/dL  High..........................160-189 mg/dL  Very High.....................>190 mg/dL           LV LATERAL E/E' RATIO 7.27           LV SEPTAL E/E' RATIO 10.00           LV EDV .40           Left Ventricular End Diastolic Volume by Teichholz Method 124.40           LV EDV A4C 80.65           Left Ventricular End Systolic Volume by Teichholz Method 48.96           LVIDd 5.1           LVIDs 3.4           LV mass 200.8           Left Ventricular Outflow Tract Mean Gradient 2.56           Left Ventricular Outflow Tract Mean Velocity 0.76           LVOT diameter 2.1           LVOT peak teodoro 1.1           LVOT stroke volume 76.9           LVOT peak VTI 22.2           LV ESV BP 48.96           Lymph #   1.9         Lymph %   24.1         MCH   29.3         MCHC   31.5         MCV   93          Mean e' 0.10           Mono #   0.2         Mono %   2.9         MPV   9.9         MV valve area p 1/2 method 3.38           MV Peak A Leonardo 0.67           MV Peak E Leonardo 0.80           MV stenosis pressure 1/2 time 65.14           Non-HDL Cholesterol   124  Comment: Risk category and Non-HDL cholesterol goals:  Coronary heart disease (CHD)or equivalent (10-year risk of CHD >20%):  Non-HDL cholesterol goal     <130 mg/dL  Two or more CHD risk factors and 10-year risk of CHD <= 20%:  Non-HDL cholesterol goal     <160 mg/dL  0 to 1 CHD risk factor:  Non-HDL cholesterol goal     <190 mg/dL              124  Comment: Risk category and Non-HDL cholesterol goals:  Coronary heart disease (CHD)or equivalent (10-year risk of CHD >20%):  Non-HDL cholesterol goal     <130 mg/dL  Two or more CHD risk factors and 10-year risk of CHD <= 20%:  Non-HDL cholesterol goal     <160 mg/dL  0 to 1 CHD risk factor:  Non-HDL cholesterol goal     <190 mg/dL           nRBC   0         Platelet Count   279         POC Anion Gap     14       POC BUN     14       POC Chloride     105       POC Creatinine     0.8       POC Glucose     111       POC Hematocrit     38       POC Ionized Calcium     1.27       POC Potassium     4.4       POC Sodium     139       POC TCO2 (MEASURED)     26       Potassium   5.0  Comment: Specimen moderately hemolyzed         PROTEIN TOTAL   7.1         PT   11.8            11.8         PV peak gradient 3           PV PEAK VELOCITY 0.93           PW 1.0           RA Major Axis 3.29           Est. RA pres 3           RA Width 3.0           RBC   4.09         RDW   12.4         RV S' 16.35           RV TB RVSP 5           RV/LV Ratio 0.69           RVDD 3.52           RV- cordero basal diam 3.5           Sample     VENOUS       Sinus 3.07           Sodium   138         STJ 3.10           TAPSE 2.21           TDI SEPTAL 0.08           TDI LATERAL 0.11           Total Cholesterol/HDL Ratio   4.0            4.0          Triglycerides   83  Comment: The National Cholesterol Education Program (NCEP) has set the  following guidelines (reference values) for triglycerides:  Normal......................<150 mg/dL  Borderline High.............150-199 mg/dL  High........................200-499 mg/dL              83  Comment: The National Cholesterol Education Program (NCEP) has set the  following guidelines (reference values) for triglycerides:  Normal......................<150 mg/dL  Borderline High.............150-199 mg/dL  High........................200-499 mg/dL           Triscuspid Valve Regurgitation Peak Gradient 12           TR Max Leonardo 1.72           TSH   0.826            0.826         TV resting pulmonary artery pressure 15           WBC   7.89               All pertinent lab results from the past 24 hours have been reviewed.    Significant Imaging: I have reviewed and interpreted all pertinent imaging results/findings within the past 24 hours.

## 2024-10-04 NOTE — PROGRESS NOTES
[facility]  Progress Note    Patient Name: Flakita Douglas  MRN: [id number]  Patient Class: IP- Inpatient   Admission Date: 10/3/2024  Length of Stay: 1 days  Attending Physician: Pepper Robison, *  Primary Care Provider: Ivet Marvin MD        Subjective:     Principal Problem:Transient alteration of awareness        HPI:  Ms Flakita Douglas is a 44 y.o. woman who presented to ED as code stroke from PACU.     She has no chronic medical problems. She had laparoscopic salpingectomy and IUD removal in OR today with Dr Ferrer. She recovered in PACU, was at baseline status, then had sudden onset aphasia at 10:30AM. Code stroke called. She was transferred to ED. CTH no acute changes and CTA no LVO. She was given rectal asa.    She is currently lethargic, lying in bed with her head turned to the R side. She opens eyes to touch/voice and seems to be trying to speak but does not speak. She tries to follow commands but is very slow to respond and diffusely weak.     ED spoke to sister who is speech therapist- apparently both patients parent and patient sister have an inherited clotting disorder and have had stroke at young age. Unable to reach sister when called at 1:28PM.     Admitted for further workup.     Overview/Hospital Course:  44 years old female,woman who presented to ED as code stroke from PACU.     She has no chronic medical problems. She had laparoscopic salpingectomy and IUD removal in OR  with Dr Ferrer. She recovered in PACU, was at baseline status, then had sudden onset aphasia at 10:30AM. Code stroke called. She was transferred to ED. CTH no acute changes and CTA no LVO. She was given rectal asa.    She is currently lethargic, lying in bed with her head turned to the R side. She opens eyes to touch/voice and seems to be trying to speak but does not speak. She tries to follow commands but is very slow to respond and diffusely weak.   ED spoke to sister who is  speech therapist- apparently both patients parent and patient sister have an inherited clotting disorder and have had stroke at young age. Unable to reach sister when called at 1:28PM.   CTA head,neck  and MRI show no acute process,she is alert time 3 at this time,neurology is following and patient will have EEG today.        Interval History: CTA head,neck  and MRI show no acute process,she is alert time 3 at this time,neurology is following and patient will have EEG today.    Review of Systems   Constitutional:  Positive for activity change and appetite change.   Respiratory:  Negative for apnea and choking.    Gastrointestinal:  Negative for abdominal distention and abdominal pain.   Genitourinary:  Negative for difficulty urinating and dyspareunia.   Neurological:  Positive for weakness.     Objective:     Vital Signs (Most Recent):  Temp: 98.6 °F (37 °C) (10/04/24 0744)  Pulse: 60 (10/04/24 0744)  Resp: 18 (10/04/24 0744)  BP: 117/66 (10/04/24 0744)  SpO2: 98 % (10/04/24 0744) Vital Signs (24h Range):  Temp:  [97.8 °F (36.6 °C)-98.6 °F (37 °C)] 98.6 °F (37 °C)  Pulse:  [55-76] 60  Resp:  [13-32] 18  SpO2:  [95 %-100 %] 98 %  BP: ()/(56-87) 117/66     Weight: 88.3 kg (194 lb 10.7 oz)  Body mass index is 31.42 kg/m².    Intake/Output Summary (Last 24 hours) at 10/4/2024 1032  Last data filed at 10/4/2024 0417  Gross per 24 hour   Intake 0 ml   Output 600 ml   Net -600 ml         Physical Exam  Cardiovascular:      Rate and Rhythm: Normal rate and regular rhythm.   Pulmonary:      Effort: No respiratory distress.      Breath sounds: No wheezing.   Neurological:      Mental Status: She is alert and oriented to person, place, and time.      Cranial Nerves: No cranial nerve deficit.      Motor: No weakness.             Significant Labs: All pertinent labs within the past 24 hours have been reviewed.  BMP:   Recent Labs   Lab 10/03/24  1126         K 5.0      CO2 22*   BUN 13   CREATININE 0.8    CALCIUM 8.8     CBC:   Recent Labs   Lab 10/03/24  1114 10/03/24  1126   WBC  --  7.89   HGB  --  12.0   HCT 38 38.1   PLT  --  279     CMP:   Recent Labs   Lab 10/03/24  1126      K 5.0      CO2 22*      BUN 13   CREATININE 0.8   CALCIUM 8.8   PROT 7.1   ALBUMIN 3.6   BILITOT 1.1*   ALKPHOS 36*   AST 27   ALT 14   ANIONGAP 7*       Significant Imaging: I have reviewed all pertinent imaging results/findings within the past 24 hours.    Assessment/Plan:      * Transient alteration of awareness  Acute onset aphasia after having recovered completely from surgery. CTH, CTA no CVA/LVO  - check MRI brain  - NPO for now- rectal asa given  - TTE with bubble study  - apparently there is a family history of clotting disorder- need to parse this out further with family when they are able  - monitor on tele  - PT, OT, ST consults- can't participate now, likely tomorrow  - Neuro consult ,  CTA head,neck  and MRI show no acute process,she is alert time 3 at this time,neurology is following and patient will have EEG today.      Status post bilateral salpingectomy  - Completed 10/3/24  - follow up with GYN as outpatient       VTE Risk Mitigation (From admission, onward)           Ordered     enoxaparin injection 40 mg  Daily         10/03/24 1303     IP VTE HIGH RISK PATIENT  Once         10/03/24 1303     Place CODI hose  Until discontinued         10/03/24 1303     Place sequential compression device  Until discontinued         10/03/24 1303                    Discharge Planning   HERON:      Code Status: Full Code   Is the patient medically ready for discharge?:     Reason for patient still in hospital (select all that apply): Patient trending condition                     Pepper Robison MD  Department of Hospital Medicine   [facility] - Telemetry

## 2024-10-04 NOTE — SUBJECTIVE & OBJECTIVE
Interval History: CTA head,neck  and MRI show no acute process,she is alert time 3 at this time,neurology is following and patient will have EEG today.    Review of Systems   Constitutional:  Positive for activity change and appetite change.   Respiratory:  Negative for apnea and choking.    Gastrointestinal:  Negative for abdominal distention and abdominal pain.   Genitourinary:  Negative for difficulty urinating and dyspareunia.   Neurological:  Positive for weakness.     Objective:     Vital Signs (Most Recent):  Temp: 98.6 °F (37 °C) (10/04/24 0744)  Pulse: 60 (10/04/24 0744)  Resp: 18 (10/04/24 0744)  BP: 117/66 (10/04/24 0744)  SpO2: 98 % (10/04/24 0744) Vital Signs (24h Range):  Temp:  [97.8 °F (36.6 °C)-98.6 °F (37 °C)] 98.6 °F (37 °C)  Pulse:  [55-76] 60  Resp:  [13-32] 18  SpO2:  [95 %-100 %] 98 %  BP: ()/(56-87) 117/66     Weight: 88.3 kg (194 lb 10.7 oz)  Body mass index is 31.42 kg/m².    Intake/Output Summary (Last 24 hours) at 10/4/2024 1032  Last data filed at 10/4/2024 0417  Gross per 24 hour   Intake 0 ml   Output 600 ml   Net -600 ml         Physical Exam  Cardiovascular:      Rate and Rhythm: Normal rate and regular rhythm.   Pulmonary:      Effort: No respiratory distress.      Breath sounds: No wheezing.   Neurological:      Mental Status: She is alert and oriented to person, place, and time.      Cranial Nerves: No cranial nerve deficit.      Motor: No weakness.             Significant Labs: All pertinent labs within the past 24 hours have been reviewed.  BMP:   Recent Labs   Lab 10/03/24  1126         K 5.0      CO2 22*   BUN 13   CREATININE 0.8   CALCIUM 8.8     CBC:   Recent Labs   Lab 10/03/24  1114 10/03/24  1126   WBC  --  7.89   HGB  --  12.0   HCT 38 38.1   PLT  --  279     CMP:   Recent Labs   Lab 10/03/24  1126      K 5.0      CO2 22*      BUN 13   CREATININE 0.8   CALCIUM 8.8   PROT 7.1   ALBUMIN 3.6   BILITOT 1.1*   ALKPHOS 36*   AST 27    ALT 14   ANIONGAP 7*       Significant Imaging: I have reviewed all pertinent imaging results/findings within the past 24 hours.

## 2024-10-04 NOTE — NURSING
Received handoff report from morning shift. Patient lying on bed, no acute distress noted, breathing even and unlabored. Family at the bedside. Safety precautions maintained. Care assumed at this time.

## 2024-10-04 NOTE — ASSESSMENT & PLAN NOTE
44 yr old female with medical history of migraines with admission concerns of laparoscopic salpingectomy and IUD removal. Post op course complicated by transient encephalopathy / and neurology has been consulted for the same.     History from patient / medical records review. Event of starring spell during the post operative recovery, with reduced verbal expression, with associated generalized weakness and reduced voluntary movements of extremities; with slow gradual improvement in overall symptomatology. No report of GTC event, with obvious post ictal confusion. No associated loss of bladder continence / tongue biting. No prodromal symptoms. No clear triggers. No immediate correlating risks. No substance abuse. No history of epilepsy. No family history of epilepsy. No history of strokes or complex migraines.      Pertinent history similar events in the past - two events 2023 & 7/2024 / more in spectrum of reduced verbal expression / difficulty in articulation - stuttering phenomenon - with gradual improvement in symptoms over the days; with fluctuations through the course. Neuroimaging's during the time have been unrevealing. However denied any significant mood disorder     Currently awake, following commands, reduced verbal expression - stuttering; excessive variability of presentation; struggling behaviors; delayed voluntary movements in extremities. No focal motor or cranial nerve deficits  Labs no elevated WBC - otherwise unremarkable.   MRI brain / CTA head and neck - unrevealing for any acute pathology     Recs:  Encephalopathy - transient / fluctuating in the spectrum of starring spell, with reduced verbal expression, with generalized weakness and reduced voluntary movements of extremities. 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 Low suspicion for focal cerebrovascular ischemic event or epileptic or neuro inflammatory etiology - however  shall rule out      F/u EEG / If negative - no active interventions or investigations needed from neuro standpoint     - Prn ativan 1-2 mg IV if GTC or focal complex with secondary generalization > 1 mins / repeat ativan every 2 mins to .1mg/kg for uncontrolled seizures    Encephalopathy evaluation and management  -- Correct lytes as needed / investigate and control infection if any / avoid hypoxia or hypercapnia / avoid hypoglycemia / control uremia - avoid rapid correction / avoid-correct metabolic-respiratory acidosis or alkalosis   -- Correct any nutritional deficiencies / correct anemia / provide nutritional support as appropriate / ambulate when appropriate   -- PT/OT evaluation and management   -- delirium precautions      Consider OP neuropsychology evaluation   PRN OP neurology

## 2024-10-04 NOTE — PLAN OF CARE
Case Management Assessment     PCP: Ivet Madden  Pharmacy: [facility]    Patient Arrived From: home   Existing Help at Home: spouse    Barriers to Discharge: none    Discharge Plan:    A. Home with family   B. Home with family    SW completed initial assessment and discussed discharge planning with patient and her spouse Bridget at her bedside. Patient lives with his spouse and her children, they are her support system. Patient's spouse or her mother will provide transportation for her to get home when discharge from hospital.      10/04/24 1143   Discharge Assessment   Assessment Type Discharge Planning Assessment   Confirmed/corrected address, phone number and insurance Yes   Confirmed Demographics Correct on Facesheet   Source of Information patient;family   Communicated HERON with patient/caregiver Date not available/Unable to determine   Reason For Admission Transient alteration of awarness   People in Home spouse;child(margret), adult   Do you expect to return to your current living situation? Yes   Do you have help at home or someone to help you manage your care at home? Yes   Who are your caregiver(s) and their phone number(s)? bridget moe (spouse)  [phone] (Mobile)   Prior to hospitilization cognitive status: Alert/Oriented   Current cognitive status: Alert/Oriented   Walking or Climbing Stairs Difficulty no   Dressing/Bathing Difficulty no   Equipment Currently Used at Home none   Readmission within 30 days? No   Patient currently being followed by outpatient case management? No   Do you currently have service(s) that help you manage your care at home? No   Do you take prescription medications? Yes   Do you have prescription coverage? Yes   Coverage BCBS   Do you have any problems affording any of your prescribed medications? No   Is the patient taking medications as prescribed? yes   Who is going to help you get home at discharge? bridget moe (Spouse)  [phone] (Mobile)    How do you get to doctors appointments? car, drives self;family or friend will provide   Are you on dialysis? No   Do you take coumadin? No   Discharge Plan A Home with family   Discharge Plan B Home with family   DME Needed Upon Discharge  none   Discharge Plan discussed with: Patient;Spouse/sig other   Name(s) and Number(s) bridget moe (Friend)  [phone] (Mobile)   Transition of Care Barriers None   OTHER   Name(s) of People in Home bridget moe (Friend)  [phone] (Mobile)

## 2024-10-04 NOTE — PT/OT/SLP EVAL
Physical Therapy Evaluation    Patient Name:  Flakita Douglas   MRN:  [id number]    Recommendations:     Discharge Recommendations: Low Intensity Therapy (with caregiver support)   Discharge Equipment Recommendations: walker, rolling (if D/C'ed home soon)   Barriers to discharge: None    Assessment:     Flakita Douglas is a 44 y.o. female admitted with a medical diagnosis of Transient alteration of awareness.  She presents with the following impairments/functional limitations: weakness, impaired endurance, impaired self care skills, impaired functional mobility, gait instability, impaired balance, decreased lower extremity function, pain.    Rehab Prognosis: Good; patient would benefit from acute skilled PT services to address these deficits and reach maximum level of function.    Recent Surgery: * No surgery found *      Plan:     During this hospitalization, patient to be seen 6 x/week to address the identified rehab impairments via gait training, therapeutic activities, therapeutic exercises and progress toward the following goals:    Plan of Care Expires:  10/18/24    Subjective     Chief Complaint: abdominal pain and weakness; Pt reported return of speech is ~50% at this time.  Patient/Family Comments/goals: Pt agreeable to PT eval.   Pain/Comfort:  Pain Rating 1:  (Pt c/o abdominal pain.)  Pain Addressed 1: Pre-medicate for activity, Reposition, Distraction, Cessation of Activity, Nurse notified      Living Environment:  Pt lives with spouse, 2 children, and mother in a [facility] with ~2 BRIDGET and no HR at entry.   Prior to admission, patients level of function was independent, driving, and working.  Pt enjoys running for exercise.  Equipment used at home: none.  Upon discharge, patient will have assistance from family.    Objective:     Patient found HOB elevated with peripheral IV, pulse ox (continuous), telemetry, PureWick  upon PT entry to room.    General Precautions: Standard, fall  Orthopedic Precautions:N/A    Braces: N/A  Respiratory Status: Room air    Exams:  Cognitive Exam:  Patient was able to follow multiple commands.   Fine Motor Coordination:    -       Intact  RLE heel shin, LLE heel shin, and Rapid alternating ankle DF/PF however at a slower speed  Gross Motor Coordination:  WFL  Postural Exam:  Patient presented with the following abnormalities:    -       No postural abnormalities identified  Sensation:    -       Intact  light/touch BLE  Skin Integrity/Edema:      -       Skin integrity: Visible skin intact  -       Edema: None noted BLE  BLE ROM: WFL  BLE Strength: WFL, grossly 4/5    Functional Mobility:  Pt able to verbalize, however speaking slowly with low tone.  Pt reported recent sensitivity to light since June/July 2024.  Pt required extra time to complete functional mobility 2* abdominal pain and c/o weakness and dizziness.  Pt reported R side felt weaker.  Pt with limited ambulation 2* pain.  PT will continue to follow.   Bed Mobility:     Rolling Left:  contact guard assistance  Scooting: contact guard assistance  Supine to Sit: contact guard assistance and minimum assistance  Sit to Supine: contact guard assistance  Transfers:     Sit to Stand: contact guard assistance and minimum assistance with rolling walker x2 trials; Pt required extra time to increase trunk extension 2* abdominal pain.  Gait: Pt ambulated ~6 sidesteps along bedside with CGA using RW.  Pt declined further ambulation within room and in the hallway 2* pain.  Pt with slow initiation, decreased step length, and decreased naren.   Balance: Pt with fair static sit/stand balance.  Pt sat EOB for a period of time prior to standing trials 2* c/o dizziness which resolved.        AM-PAC 6 CLICK MOBILITY  Total Score:17       Treatment & Education:  BLE seated therex as tolerated: hip flex, LAQ, and AP    Balance Training  Static Standing:  Patient performed static standing on level surface  using rolling walker with min A-Contact  Guard Assistance and moderate verbal cues for trunk extension and standing tolerance.  Pt limited by pain.       Pt/mother educated on acute skilled PT services and goals.  Pt encouraged OOB>chair with nursing assistance while in the hospital.  Pt verbalized good understanding.       Patient left left sidelying and HOB elevated with all lines intact, call button in reach, and nurse Marcy and mother present.  Tray table close by.     GOALS:   Multidisciplinary Problems       Physical Therapy Goals          Problem: Physical Therapy    Goal Priority Disciplines Outcome Interventions   Physical Therapy Goal     PT, PT/OT Progressing    Description: Goals to be met by: 10/18/24     Patient will increase functional independence with mobility by performin. Supine to sit with Modified Potter  2. Rolling to Left and Right with Modified Potter  3. Sit to stand transfer with Modified Potter  4. Bed to chair transfer with Modified Potter   5. Gait x250 feet with Modified Potter with or without Rolling Walker  6. Lower extremity exercise program 2 sets x15 reps per handout, with independence                         History:     Past Medical History:   Diagnosis Date    Obesity, unspecified     PFO (patent foramen ovale)     Stroke-like symptoms     Hx of AMS & difficulty speaking,    TIA (transient ischemic attack)        Past Surgical History:   Procedure Laterality Date    LAPAROSCOPIC SALPINGECTOMY Bilateral 10/3/2024    Procedure: SALPINGECTOMY, LAPAROSCOPIC;  Surgeon: Lena Angela MD;  Location: [facility] OR;  Service: OB/GYN;  Laterality: Bilateral;  RN PREOP 2024---T/S done----UPT NEG ON 10/1---NEED ORDERS    REMOVAL OF INTRAUTERINE DEVICE (IUD)  10/03/2024    REMOVAL OF INTRAUTERINE DEVICE (IUD) N/A 10/3/2024    Procedure: REMOVAL, INTRAUTERINE DEVICE;  Surgeon: Lena Angela MD;  Location: [facility] OR;  Service: OB/GYN;  Laterality: N/A;    SALPINGECTOMY Bilateral  10/03/2024       Time Tracking:     PT Received On: 10/04/24  PT Start Time: 1032     PT Stop Time: 1053  PT Total Time (min): 21 min     Billable Minutes: Evaluation 13 min and Therapeutic Activity 8 min      10/04/2024

## 2024-10-04 NOTE — PT/OT/SLP EVAL
"Speech Language Pathology Evaluation  Bedside Swallow    Patient Name:  Flakita Douglas   MRN:  [id number]  Admitting Diagnosis: Altered mental status    Recommendations:                 General Recommendations:  ST f/u x1  Diet recommendations:  Regular Diet - IDDSI Level 7, Thin liquids - IDDSI Level 0   Aspiration Precautions: Meds whole 1 at a time and Standard aspiration precautions   General Precautions: Standard,    Communication strategies:  none    Assessment:     Flakita Douglas is a 44 y.o. female with a dx of Altered mental status, who presents w/ functional swallowing abilities and is w/o oropharyngeal dysphagia. ST recs regular diet w/ thin liquids. Meds whole. Pt is back at baseline mentation, however, occasional delayed responses and strained verbal expression noted. ST to f/u to ax necessity for informal language eval, pending pt's improvement 2/2 MRI clear of acute infarction.       History:     Past Medical History:   Diagnosis Date    Obesity, unspecified     PFO (patent foramen ovale)     Stroke-like symptoms     Hx of AMS & difficulty speaking,    TIA (transient ischemic attack)        Past Surgical History:   Procedure Laterality Date    REMOVAL OF INTRAUTERINE DEVICE (IUD)  10/03/2024    SALPINGECTOMY Bilateral 10/03/2024       Social History: Patient lives with her spouse and was grossly indep prior to admit.    Prior Intubation HX:  Procedure 10/3    Modified Barium Swallow: None    Chest X-Rays: None this admit    MRI Brain Without Contrast:  10/4/24    Impression:     No acute intracranial process    Prior diet: Unrestricted per pt and .      Subjective     ST obtained clearance from pt's nurse for b/s swallow evaluation prior to entrance. Pt was asleep upon ST's entrance, however, easily arouse to ST's greeting. Pt's  at b/s. Pt was able to recall all events that led to admit, however, occasional delayed responses noted.     Patient goals: "I want jello" "     Pain/Comfort:  Pain Rating 1: 0/10    Respiratory Status: Room air    Objective:     Oral Musculature Evaluation  Oral Musculature: WFL  Dentition: present and adequate  Secretion Management: adequate  Mucosal Quality: good  Oral Labial Strength and Mobility: WFL  Lingual Strength and Mobility: WFL  Volitional Cough: Intact  Volitional Swallow: Intact  Voice Prior to PO Intake: Clear and audible    Bedside Swallow Eval:   Consistencies Assessed:  Thin liquids -Straw sips of water  Solids -x1 bite sized piece of latrell cracker       Oral Phase:   WFL    Pharyngeal Phase:   no overt clinical signs/symptoms of aspiration  no overt clinical signs/symptoms of pharyngeal dysphagia    Compensatory Strategies  None    Treatment: It should be noted that silent aspiration cannot be r/o via b/s assessment. ST provided education to the pt and  which included role of ST in POC, ST POC, and diet recs. Pt and  verbalized an understanding of all information given and was agreeable to all recs.       ST notified pt's nurse and MD regarding diet recs.      Goals:   Multidisciplinary Problems       SLP Goals       Not on file                    Plan:     Patient to be seen:      Plan of Care expires:     Plan of Care reviewed with:  patient, spouse   SLP Follow-Up:  No       Discharge recommendations:  No Therapy Indicated   Barriers to Discharge:  None    Time Tracking:     SLP Treatment Date:   10/04/24  Speech Start Time:  0930  Speech Stop Time:  0945     Speech Total Time (min):  15 min    Billable Minutes: Eval Swallow and Oral Function 15    10/04/2024

## 2024-10-04 NOTE — NURSING
[facility], [facility]  Nurses Note -- 4 Eyes      10/4/2024       Skin assessed on: Q Shift      [x] No Pressure Injuries Present    []Prevention Measures Documented    [] Yes LDA  for Pressure Injury Previously documented     [] Yes New Pressure Injury Discovered   [] LDA for New Pressure Injury Added      Attending RN:  Sravani Ramirez LPN     Second RN:  Juan F Marshall RN

## 2024-10-04 NOTE — PLAN OF CARE
Problem: Occupational Therapy  Goal: Occupational Therapy Goal  Description: Goals to be met by: 10/11/24      Patient will increase functional independence with ADLs by performing:    UE Dressing with [place].  LE Dressing with [place].  Grooming while standing at sink with [place].  Toileting from toilet with Modified [place] for hygiene and clothing management.   Sitting in chair x60  minutes with Modified [place].  Toilet transfer to toilet with Modified [place].  Upper extremity exercise program x10  reps per handout, with supervision due to abdominal precautions such as no heavy lifting or heavy resistive exercises .    Outcome: Progressing   Patient recommended for low intensity level and no DME needed.   Occupational therapy to see 2x/week in acute care.

## 2024-10-04 NOTE — PLAN OF CARE
B/s swallow eval completed. ST recs regular diet w/ thin liquids. Meds whole.  Pt w/ inconsistent delayed responses, w/ vocal quality strained and anomia noted inconsistently at the conversational level. ST to f/u x1 to ax necessity of language evaluation 2/2 MRI clear of acute infarction.

## 2024-10-04 NOTE — HOSPITAL COURSE
44 years old female,woman who presented to ED as code stroke from PACU.     She has no chronic medical problems. She had laparoscopic salpingectomy and IUD removal in OR  with Dr Ferrer. She recovered in PACU, was at baseline status, then had sudden onset aphasia at 10:30AM. Code stroke called. She was transferred to ED. CTH no acute changes and CTA no LVO. She was given rectal asa.  ED spoke to sister who is speech therapist- apparently both patients parent and patient sister have an inherited clotting disorder and have had stroke at young age..   CTA head,neck  and MRI show no acute process,she was alert time 3 next day,neurology was following ,EEG was unremarkable,Echo. Show no sign of thrombosis,transient changed in  alertness is resolved,patient was talking and eating with no difficulty,did well with PT,OT,patient was discharged home with follow up plan with PCP,OB and and neurology as out patient.

## 2024-10-04 NOTE — PROCEDURES
EEG Extended Monitoring up to one hour    Date/Time: 10/4/2024 11:50 AM    Performed by: Anjelica Peres MD  Authorized by: Jeane Madden MD        ELECTROENCEPHALOGRAM REPORT    DATE OF SERVICE: 10/04/2024  EEG NUMBER: OW   LOCATION OF SERVICE: [facility]   Electroencephalographic (EEG) recording is with electrodes placed according to the International 10-20 placement system.  Thirty two (32) channels of digital signal (sampling rate of 512/sec) including T1 and T2 was simultaneously recorded from the scalp and may include  EKG, EMG, and/or eye monitors.  Recording band pass was 0.1 to 512 hz.  Digital video recording of the patient is simultaneously recorded with the EEG.  The patient is instructed report clinical symptoms which may occur during the recording session.  EEG and video recording is stored and archived in digital format. Activation procedures which include photic stimulation, hyperventilation and instructing patients to perform simple task are done in selected patients.    The EEG is displayed on a monitor screen and can be reviewed using different montages.  Computer assisted analysis is employed to detect spike and electrographic seizure activity.   The entire record is submitted for computer analysis.  The entire recording is visually reviewed and the times identified by computer analysis as being spikes or seizures are reviewed again.  Compresses spectral analysis (CSA) is also performed on the activity recorded from each individual channel.  This is displayed as a power display of frequencies from 0 to 30 Hz over time.   The CSA is reviewed looking for asymmetries in power between homologous areas of the scalp and then compared with the original EEG recording.     [employer] software was also utilized in the review of this study.  This software suite analyzes the EEG recording in multiple domains.  Coherence and rhythmicity is computed to identify EEG sections which may contain  organized seizures.  Each channel undergoes analysis to detect presence of spike and sharp waves which have special and morphological characteristic of epileptic activity.  The routine EEG recording is converted from spacial into frequency domain.  This is then displayed comparing homologous areas to identify areas of significant asymmetry.  Algorithm to identify non-cortically generated artifact is used to separate eye movement, EMG and other artifact from the EEG    EEG FINDINGS  During the maximally alert state, a 10-11 Hz posterior dominant rhythm was seen which was symmetrical, well-regulated and briskly attenuated to eye opening. In the more anterior head regions, symmetric frontocentral beta frequencies predominated. As drowsiness occurred, the posterior dominant rhythm attenuated, slow rolling eye movements appeared, and symmetrical vertex sharp transients were seen.  Sleep was not captured during the study.    No epileptiform findings were noted, no electrographic seizures were seen, and no clinical events were reported.    Activation Procedures were not performed      IMPRESSION:  Normal awake and drowsy    CLINICAL CORRELATION:  This is a normal EEG in awake and drowsy states.  There were no epileptiform findings, electrographic seizures, or no clinical events recorded. An EEG without epileptiform findings does not exclude the possibility of epilepsy. If the clinical suspicion of epilepsy is high, a repeat EEG after sleep depreivation, following a seizure, or a prolonged EEG may increase the frequency of detecting epileptiform discharges.      Anjelica Perse MD  Neurology  [facility]

## 2024-10-05 VITALS
BODY MASS INDEX: 31.29 KG/M2 | TEMPERATURE: 98 F | OXYGEN SATURATION: 98 % | RESPIRATION RATE: 19 BRPM | DIASTOLIC BLOOD PRESSURE: 66 MMHG | HEART RATE: 59 BPM | WEIGHT: 194.69 LBS | SYSTOLIC BLOOD PRESSURE: 120 MMHG | HEIGHT: 66 IN

## 2024-10-05 PROCEDURE — 97116 GAIT TRAINING THERAPY: CPT | Mod: CQ

## 2024-10-05 PROCEDURE — 97530 THERAPEUTIC ACTIVITIES: CPT | Mod: CQ

## 2024-10-05 PROCEDURE — 25000003 PHARM REV CODE 250: Performed by: HOSPITALIST

## 2024-10-05 RX ORDER — HYDROCODONE BITARTRATE AND ACETAMINOPHEN 5; 325 MG/1; MG/1
1 TABLET ORAL EVERY 8 HOURS PRN
Qty: 10 TABLET | Refills: 0 | Status: SHIPPED | OUTPATIENT
Start: 2024-10-05 | End: 2024-10-11

## 2024-10-05 RX ADMIN — ACETAMINOPHEN 650 MG: 325 TABLET ORAL at 06:10

## 2024-10-05 NOTE — PT/OT/SLP PROGRESS
Physical Therapy Treatment    Patient Name:  Flakita Douglas   MRN:  [id number]    Recommendations:     Discharge Recommendations: Low Intensity Therapy (with caregiver support)  Discharge Equipment Recommendations: walker, rolling (if D/C'ed home soon)  Barriers to discharge: None    Assessment:     Flakita Douglas is a 44 y.o. female admitted with a medical diagnosis of Transient alteration of awareness.  She presents with the following impairments/functional limitations: weakness, impaired endurance, impaired sensation, gait instability, impaired self care skills, impaired balance, decreased lower extremity function, pain, decreased safety awareness, decreased ROM, decreased coordination .    Rehab Prognosis: Good; patient would benefit from acute skilled PT services to address these deficits and reach maximum level of function.    Recent Surgery: * No surgery found *      Plan:     During this hospitalization, patient to be seen 6 x/week to address the identified rehab impairments via gait training, therapeutic activities, therapeutic exercises and progress toward the following goals:    Plan of Care Expires:  10/18/24    Subjective     Chief Complaint: weakness   Patient/Family Comments/goals: pt is pleasant and agreeable to therapy , supportive mother present   Pain/Comfort:  Pain Rating 1: 1/10  Location 1: abdomen  Pain Addressed 1: Cessation of Activity, Reposition  Pain Rating Post-Intervention 1: 1/10      Objective:     Communicated with nurse prior to session.  Patient found HOB elevated with peripheral IV upon PT entry to room.     General Precautions: Standard, fall  Orthopedic Precautions: N/A  Braces: N/A  Respiratory Status: Room air     Functional Mobility:  Bed Mobility: performed log roll technique for bed mobility     Rolling Left:  supervision  Scooting: supervision  Supine to Sit: supervision, HOB elevated, bedside rail   Sit to Supine: supervision , bedside rail   Transfers:     Sit to Stand:   supervision with no AD  Gait:  pt ambulated ~ 100 ft with (B tennis shoes)  , HHA , CGA . Noted with decreased naren,decreased step length, min shakiness however no LOB. VC's for safety technique . Pt with c/o min dizziness.   Balance:  good in sitting, fair in standing       AM-PAC 6 CLICK MOBILITY  Turning over in bed (including adjusting bedclothes, sheets and blankets)?: 4  Sitting down on and standing up from a chair with arms (e.g., wheelchair, bedside commode, etc.): 4  Moving from lying on back to sitting on the side of the bed?: 3  Moving to and from a bed to a chair (including a wheelchair)?: 3  Need to walk in hospital room?: 3  Climbing 3-5 steps with a railing?: 3  Basic Mobility Total Score: 20       Treatment & Education:  BLE HEP given with instructions and demonstrations (pt and pt's mother verbalize understanding). Encouraged pt to perform BLE ex's per handout throughout the day.     Patient left HOB elevated with all lines intact, call button in reach, nurse notified, and mother  present..    GOALS:   Multidisciplinary Problems       Physical Therapy Goals          Problem: Physical Therapy    Goal Priority Disciplines Outcome Interventions   Physical Therapy Goal     PT, PT/OT Progressing    Description: Goals to be met by: 10/18/24     Patient will increase functional independence with mobility by performin. Supine to sit with Modified [place]  2. Rolling to Left and Right with Modified [place]  3. Sit to stand transfer with Modified [place]  4. Bed to chair transfer with Modified [place]   5. Gait x250 feet with Modified [place] with or without Rolling Walker  6. Lower extremity exercise program 2 sets x15 reps per handout, with independence                         Time Tracking:     PT Received On: 10/05/24  PT Start Time: 954     PT Stop Time: 1017  PT Total Time (min): 23 min     Billable Minutes: Gait Training 15 and Therapeutic Activity 8    Treatment  Type: Treatment  PT/PTA: PTA     Number of PTA visits since last PT visit: 1     10/05/2024

## 2024-10-05 NOTE — DISCHARGE SUMMARY
[facility] Medicine  Discharge Summary      Patient Name: Flakita Douglas  MRN: [id number]  [facility]: [id number]  Patient Class: IP- Inpatient  Admission Date: 10/3/2024  Hospital Length of Stay: 2 days  Discharge Date and Time:  10/05/2024 7:29 AM  Attending Physician: Pepper Robison, *   Discharging Provider: Pepper Robison MD  Primary Care Provider: Ivet Marvin MD    Primary Care Team: Networked reference to record PCT     HPI:   Ms Flakita Douglas is a 44 y.o. woman who presented to ED as code stroke from PACU.     She has no chronic medical problems. She had laparoscopic salpingectomy and IUD removal in OR today with Dr Ferrer. She recovered in PACU, was at baseline status, then had sudden onset aphasia at 10:30AM. Code stroke called. She was transferred to ED. CTH no acute changes and CTA no LVO. She was given rectal asa.    She is currently lethargic, lying in bed with her head turned to the R side. She opens eyes to touch/voice and seems to be trying to speak but does not speak. She tries to follow commands but is very slow to respond and diffusely weak.     ED spoke to sister who is speech therapist- apparently both patients parent and patient sister have an inherited clotting disorder and have had stroke at young age. Unable to reach sister when called at 1:28PM.     Admitted for further workup.     * No surgery found *      Hospital Course:   44 years old female,woman who presented to ED as code stroke from PACU.     She has no chronic medical problems. She had laparoscopic salpingectomy and IUD removal in OR  with Dr Ferrer. She recovered in PACU, was at baseline status, then had sudden onset aphasia at 10:30AM. Code stroke called. She was transferred to ED. CTH no acute changes and CTA no LVO. She was given rectal asa.  ED spoke to sister who is speech therapist- apparently both patients parent and patient sister have an inherited clotting disorder and have  had stroke at young age..   CTA head,neck  and MRI show no acute process,she was alert time 3 next day,neurology was following ,EEG was unremarkable,Echo. Show no sign of thrombosis,transient changed in  alertness is resolved,patient was talking and eating with no difficulty,did well with PT,OT,patient was discharged home with follow up plan with PCP,OB and and neurology as out patient.         Goals of Care Treatment Preferences:  Code Status: Full Code      SDOH Screening:  The patient was screened for food insecurity, housing instability, transportation needs, utility difficulties, and interpersonal safety. The social determinant(s) of health identified as a concern this admission are:  Housing instability        Social Drivers of Health with Concerns     Housing Stability: High Risk (10/3/2024)        Consults:   Consults (From admission, onward)          Status Ordering Provider     Inpatient consult to Neurology  Once        Provider:  Rishi Jackson MD    Completed SHAKIR SERRA     Consult to Telemedicine - Acute Stroke  Once        Provider:  Anjelica Peres MD    Acknowledged SHAKIR SERRA     Consult to Telemedicine - Acute Stroke  Once        Provider:  Jarrett Dawson MD    Acknowledged SHAKIR SERRA            No new Assessment & Plan notes have been filed under this hospital service since the last note was generated.  Service: Hospital Medicine    Final Active Diagnoses:    Diagnosis Date Noted POA    PRINCIPAL PROBLEM:  Transient alteration of awareness [R40.4] 10/03/2024 Yes    Status post bilateral salpingectomy [Z90.79] 10/03/2024 Not Applicable      Problems Resolved During this Admission:       Discharged Condition: stable    Disposition: Home or Self Care    Follow Up:   Follow-up Information       Ivet Marvin MD Follow up in 1 week(s).    Specialty: Family Medicine  Contact information:  [address] [facility] [address] [phone]                            Patient Instructions:      Ambulatory referral/consult to Neurology   Standing Status: Future   Referral Priority: Routine Referral Type: Consultation   Referral Reason: Specialty Services Required   Requested Specialty: Neurology   Number of Visits Requested: 1     Activity as tolerated       Significant Diagnostic Studies: Labs: BMP:   Recent Labs   Lab 10/03/24  1126         K 5.0      CO2 22*   BUN 13   CREATININE 0.8   CALCIUM 8.8   , CMP   Recent Labs   Lab 10/03/24  1126      K 5.0      CO2 22*      BUN 13   CREATININE 0.8   CALCIUM 8.8   PROT 7.1   ALBUMIN 3.6   BILITOT 1.1*   ALKPHOS 36*   AST 27   ALT 14   ANIONGAP 7*   , and CBC   Recent Labs   Lab 10/03/24  1126   WBC 7.89   HGB 12.0   HCT 38.1        Radiology: X-Ray: CXR: X-Ray Chest 1 View (CXR): No results found for this visit on 10/03/24. and X-Ray Chest PA and Lateral (CXR): No results found for this visit on 10/03/24.  MRI: brain   CT scan: CTA head,neck   Cardiac Graphics: Echocardiogram: 2D echo with color flow doppler: No results found for this or any previous visit. and Transthoracic echo (TTE) complete (Cupid Only):   Results for orders placed or performed during the hospital encounter of 10/03/24   Echo Saline Bubble? Yes    Narrative      Bubble study positive for intracardiac shunt suggesting small PFO.   Limited study       EEG   Pending Diagnostic Studies:       None           Medications:  Reconciled Home Medications:      Medication List        CONTINUE taking these medications      HYDROcodone-acetaminophen 5-325 mg per tablet  Commonly known as: NORCO  Take 1 tablet by mouth every 6 (six) hours as needed for Pain.     NURTEC 75 mg odt  Generic drug: rimegepant  Take 75 mg by mouth daily as needed.            STOP taking these medications      ibuprofen 800 MG tablet  Commonly known as: ADVIL,MOTRIN              Indwelling Lines/Drains at time of discharge:   Lines/Drains/Airways        None                   Time spent on the discharge of patient: over 30  minutes         Pepper Robison MD  Department of Hospital Medicine  [facility] [place] - Telemetry

## 2024-10-05 NOTE — NURSING
[facility] [facility] [place]es [place] -- 4 [place]      10/4/2024       Skin assessed on: Q Shift      [x] No Pressure Injuries Present    []Prevention Measures Documented    [] Yes LDA  for Pressure Injury Previously documented     [] Yes New Pressure Injury Discovered   [] LDA for New Pressure Injury Added      Attending RN:  Rolando Rogel RN     Second RN:  SHAGUTFA Lassiter

## 2024-10-05 NOTE — PLAN OF CARE
Problem: Adult Inpatient Plan of Care  Goal: Plan of Care Review  Outcome: Met  Goal: Patient-Specific Goal (Individualized)  Outcome: Met  Goal: Absence of Hospital-Acquired Illness or Injury  Outcome: Met  Goal: Optimal Comfort and Wellbeing  Outcome: Met  Goal: Readiness for Transition of Care  Outcome: Met     Problem: Infection  Goal: Absence of Infection Signs and Symptoms  Outcome: Met     Problem: Wound  Goal: Optimal Coping  Outcome: Met  Goal: Optimal Functional Ability  Outcome: Met  Goal: Absence of Infection Signs and Symptoms  Outcome: Met  Goal: Improved Oral Intake  Outcome: Met  Goal: Optimal Pain Control and Function  Outcome: Met  Goal: Skin Health and Integrity  Outcome: Met  Goal: Optimal Wound Healing  Outcome: Met     Problem: Skin Injury Risk Increased  Goal: Skin Health and Integrity  Outcome: Met

## 2024-10-05 NOTE — PLAN OF CARE
Problem: Adult Inpatient Plan of Care  Goal: Optimal Comfort and Wellbeing  Intervention: Monitor Pain and Promote Comfort  Flowsheets (Taken 10/5/2024 0211)  Pain Management Interventions:   care clustered   quiet environment facilitated   warm blanket provided     Problem: Fall Injury Risk  Goal: Absence of Fall and Fall-Related Injury  Intervention: Promote Injury-Free Environment  Flowsheets (Taken 10/5/2024 0211)  Safety Promotion/Fall Prevention:   assistive device/personal item within reach   bed alarm set   room near unit station   side rails raised x 2   medications reviewed     NAD noted

## 2024-10-05 NOTE — NURSING
[facility], [facility]  Nurses Note -- 4 Eyes      10/5/2024       Skin assessed on: Q Shift      [x] No Pressure Injuries Present    []Prevention Measures Documented    [] Yes LDA  for Pressure Injury Previously documented     [] Yes New Pressure Injury Discovered   [] LDA for New Pressure Injury Added      Attending RN:  Marie Rebollar RN     Second RN:  Rolando Rogel RN

## 2024-10-05 NOTE — PLAN OF CARE
Case Management Final Discharge Note      Discharge Disposition: Home with family     New DME ordered / company name: None    Relevant SDOH / Transition of Care Barriers:  None    Primary Caretaker and contact information: Spouse Reyes [phone]    Scheduled followup appointment: Follow up with Family Medicine     Referrals placed: Neurology     Transportation: Family     Patient and family educated on discharge services and updated on DC plan. Bedside RN notified, patient clear to discharge from Case Management Perspective.     10/05/24 1026   Final Note   Assessment Type Final Discharge Note   Anticipated Discharge Disposition Home   What phone number can be called within the next 1-3 days to see how you are doing after discharge? [phone]  (Spouse- Reyes)   Hospital Resources/Appts/Education Provided Post-Acute resouces added to AVS   Post-Acute Status   Discharge Delays None known at this time

## 2024-10-05 NOTE — DISCHARGE INSTRUCTIONS
Our goal at [facility] is to always give you outstanding care and exceptional service. You may receive a survey by mail, text or e-mail in the next 7-10 days from Myke Lopez and our leadership team asking about the care you received with us. The survey should only take 5-10 minutes to complete and is very important to us.     Your feedback provides us with a way to recognize our staff who work tirelessly to provide the best care! Also, your responses help us learn how to improve when your experience was below our aspiration of excellence. We WILL use your feedback to continue making improvements to help us provide the highest quality care. We keep your personal information and feedback confidential. We appreciate your time completing this survey and can't wait to hear from you!!!     We want you to leave us today feeling like you can DEFINITELY recommend us to others! We look forward to your continued care with us! Thanks so much for choosing [facility] for your healthcare needs!

## 2024-10-05 NOTE — NURSING
Reported off to oncoming nurse, patient resting in bed, [id number]. Patient can make needs know to staff., minimal assist required for adls and transfers, no acute distress noted, safety precautions maintained.    Chart check completed.

## 2024-10-05 NOTE — PLAN OF CARE
10/05/24 0821   Post-Acute Status   Post-Acute Authorization Other  (Home; follow-ups)   Coverage [employer]   Other Status No Post-Acute Service Needs   Hospital Resources/Appts/Education Provided Provided patient/caregiver with written discharge plan information;Provided education on problems/symptoms using teachback;Appointments scheduled and added to AVS;Post-Acute resouces added to AVS   Discharge Delays None known at this time   Discharge Plan   Discharge Plan A Home with family  (Follow-ups)   Discharge Plan B Home with family  (Follow-ups)

## 2024-10-05 NOTE — NURSING
Received handoff report from morning shift. Patient lying on bed, on RA, no acute distress noted, breathing even and unlabored. Family at the bedside. Safety precautions maintained. Care assumed at this time.

## 2024-10-06 LAB
OHS QRS DURATION: 76 MS
OHS QTC CALCULATION: 436 MS

## 2024-10-07 ENCOUNTER — OFFICE VISIT (OUTPATIENT)
Dept: PRIMARY CARE CLINIC | Facility: CLINIC | Age: 44
End: 2024-10-07
Payer: COMMERCIAL

## 2024-10-07 ENCOUNTER — LAB VISIT (OUTPATIENT)
Dept: LAB | Facility: HOSPITAL | Age: 44
End: 2024-10-07
Attending: STUDENT IN AN ORGANIZED HEALTH CARE EDUCATION/TRAINING PROGRAM
Payer: COMMERCIAL

## 2024-10-07 VITALS
TEMPERATURE: 99 F | SYSTOLIC BLOOD PRESSURE: 114 MMHG | OXYGEN SATURATION: 100 % | WEIGHT: 194 LBS | HEART RATE: 73 BPM | HEIGHT: 66 IN | DIASTOLIC BLOOD PRESSURE: 74 MMHG | BODY MASS INDEX: 31.18 KG/M2

## 2024-10-07 DIAGNOSIS — R53.83 FATIGUE, UNSPECIFIED TYPE: ICD-10-CM

## 2024-10-07 DIAGNOSIS — R53.83 FATIGUE, UNSPECIFIED TYPE: Primary | ICD-10-CM

## 2024-10-07 DIAGNOSIS — R40.4 TRANSIENT ALTERATION OF AWARENESS: ICD-10-CM

## 2024-10-07 LAB
FERRITIN SERPL-MCNC: 197 NG/ML (ref 20–300)
HCYS SERPL-SCNC: 5 UMOL/L (ref 4–15.5)
IRON SERPL-MCNC: 53 UG/DL (ref 30–160)
POCT GLUCOSE: 102 MG/DL (ref 70–110)
SATURATED IRON: 15 % (ref 20–50)
TOTAL IRON BINDING CAPACITY: 364 UG/DL (ref 250–450)
TRANSFERRIN SERPL-MCNC: 246 MG/DL (ref 200–375)

## 2024-10-07 PROCEDURE — 99215 OFFICE O/P EST HI 40 MIN: CPT | Mod: S$GLB,,, | Performed by: STUDENT IN AN ORGANIZED HEALTH CARE EDUCATION/TRAINING PROGRAM

## 2024-10-07 PROCEDURE — 3074F SYST BP LT 130 MM HG: CPT | Mod: CPTII,S$GLB,, | Performed by: STUDENT IN AN ORGANIZED HEALTH CARE EDUCATION/TRAINING PROGRAM

## 2024-10-07 PROCEDURE — 85303 CLOT INHIBIT PROT C ACTIVITY: CPT | Performed by: STUDENT IN AN ORGANIZED HEALTH CARE EDUCATION/TRAINING PROGRAM

## 2024-10-07 PROCEDURE — 85306 CLOT INHIBIT PROT S FREE: CPT | Performed by: STUDENT IN AN ORGANIZED HEALTH CARE EDUCATION/TRAINING PROGRAM

## 2024-10-07 PROCEDURE — 81240 F2 GENE: CPT | Performed by: STUDENT IN AN ORGANIZED HEALTH CARE EDUCATION/TRAINING PROGRAM

## 2024-10-07 PROCEDURE — 83695 ASSAY OF LIPOPROTEIN(A): CPT | Performed by: STUDENT IN AN ORGANIZED HEALTH CARE EDUCATION/TRAINING PROGRAM

## 2024-10-07 PROCEDURE — 99999 PR PBB SHADOW E&M-EST. PATIENT-LVL III: CPT | Mod: PBBFAC,,, | Performed by: STUDENT IN AN ORGANIZED HEALTH CARE EDUCATION/TRAINING PROGRAM

## 2024-10-07 PROCEDURE — 81241 F5 GENE: CPT | Performed by: STUDENT IN AN ORGANIZED HEALTH CARE EDUCATION/TRAINING PROGRAM

## 2024-10-07 PROCEDURE — 83090 ASSAY OF HOMOCYSTEINE: CPT | Performed by: STUDENT IN AN ORGANIZED HEALTH CARE EDUCATION/TRAINING PROGRAM

## 2024-10-07 PROCEDURE — 83540 ASSAY OF IRON: CPT | Performed by: STUDENT IN AN ORGANIZED HEALTH CARE EDUCATION/TRAINING PROGRAM

## 2024-10-07 PROCEDURE — 82652 VIT D 1 25-DIHYDROXY: CPT | Performed by: STUDENT IN AN ORGANIZED HEALTH CARE EDUCATION/TRAINING PROGRAM

## 2024-10-07 PROCEDURE — 86146 BETA-2 GLYCOPROTEIN ANTIBODY: CPT | Performed by: STUDENT IN AN ORGANIZED HEALTH CARE EDUCATION/TRAINING PROGRAM

## 2024-10-07 PROCEDURE — 85240 CLOT FACTOR VIII AHG 1 STAGE: CPT | Performed by: STUDENT IN AN ORGANIZED HEALTH CARE EDUCATION/TRAINING PROGRAM

## 2024-10-07 PROCEDURE — 86147 CARDIOLIPIN ANTIBODY EA IG: CPT | Performed by: STUDENT IN AN ORGANIZED HEALTH CARE EDUCATION/TRAINING PROGRAM

## 2024-10-07 PROCEDURE — 1159F MED LIST DOCD IN RCRD: CPT | Mod: CPTII,S$GLB,, | Performed by: STUDENT IN AN ORGANIZED HEALTH CARE EDUCATION/TRAINING PROGRAM

## 2024-10-07 PROCEDURE — 1111F DSCHRG MED/CURRENT MED MERGE: CPT | Mod: CPTII,S$GLB,, | Performed by: STUDENT IN AN ORGANIZED HEALTH CARE EDUCATION/TRAINING PROGRAM

## 2024-10-07 PROCEDURE — 3008F BODY MASS INDEX DOCD: CPT | Mod: CPTII,S$GLB,, | Performed by: STUDENT IN AN ORGANIZED HEALTH CARE EDUCATION/TRAINING PROGRAM

## 2024-10-07 PROCEDURE — 85300 ANTITHROMBIN III ACTIVITY: CPT | Performed by: STUDENT IN AN ORGANIZED HEALTH CARE EDUCATION/TRAINING PROGRAM

## 2024-10-07 PROCEDURE — 82728 ASSAY OF FERRITIN: CPT | Performed by: STUDENT IN AN ORGANIZED HEALTH CARE EDUCATION/TRAINING PROGRAM

## 2024-10-07 PROCEDURE — 3078F DIAST BP <80 MM HG: CPT | Mod: CPTII,S$GLB,, | Performed by: STUDENT IN AN ORGANIZED HEALTH CARE EDUCATION/TRAINING PROGRAM

## 2024-10-07 NOTE — PROGRESS NOTES
"Primary Care  Office Visit - In Person  10/7/2024      HPI    Patient is a 44 y.o.   Flakita Douglas  has a past medical history of Obesity, unspecified, PFO (patent foramen ovale), Stroke-like symptoms, and TIA (transient ischemic attack).    Patient presenting to [facility] care   She reports three episodes of stroke-like symptoms since 2022.  The first two episodes of occurred while she was driving, with the predominating symptom being generalized weakness and what she described as feeling like her heart was beating out of her chest.  After the second episode, patient had residual right-sided weakness.  The most recent episode occurred following salpingectomy and was characterized by change in speech and inability to follow commands.   Brain imaging has been negative.  There was finding of PFO.  Patient has not undergone hypercoagulable workup.  She has now been experiencing frequent headaches associated with light sensitivity.  She reports that she was unable to tolerate Nurtec.   Symptoms have been associated with fatigue.    Active Medications:  Current Outpatient Medications   Medication Instructions    HYDROcodone-acetaminophen (NORCO) 5-325 mg per tablet 1 tablet, Oral, Every 8 hours PRN    NURTEC 75 mg, Daily PRN       Vitals:    10/07/24 1310   BP: 114/74   BP Location: Right arm   Patient Position: Sitting   Pulse: 73   Temp: 98.9 °F (37.2 °C)   SpO2: 100%   Weight: 88 kg (194 lb 0.1 oz)   Height: 5' 6" (1.676 m)       Physical Exam  Eyes:      General: No visual field deficit.  Neurological:      General: No focal deficit present.      Mental Status: She is alert and oriented to person, place, and time.      Cranial Nerves: Cranial nerves 2-12 are intact. No cranial nerve deficit or facial asymmetry.      Sensory: Sensation is intact. No sensory deficit.      Motor: Weakness present. No abnormal muscle tone or pronator drift.      Coordination: Coordination is intact. Finger-Nose-Finger Test and Heel to " Carrion Test normal.      Gait: Gait is intact.      Comments: Patient has weakness 4/5 in all extremities          Assessment and Plan     1. Fatigue, unspecified type  -     IRON AND TIBC; Future; Expected date: 10/07/2024  -     FERRITIN; Future; Expected date: 10/07/2024  -     Calcitriol; Future; Expected date: 10/07/2024    2. Transient alteration of awareness  Comments:  Brain imaging has been reassuring x 9. No focal weakness on exam. Recommend completion of hypercoagulable workup. Ddx conversion disorder   Orders:  -     Cardiolipin antibody; Future; Expected date: 10/07/2024  -     Beta-2 glycoprotein antibodies; Future; Expected date: 10/07/2024  -     Lipoprotein A (LPA); Future; Expected date: 10/07/2024  -     Homocysteine, serum; Future; Expected date: 10/07/2024  -     Factor 8 assay; Future; Expected date: 10/07/2024  -     Protein C activity; Future; Expected date: 10/07/2024  -     Protein S activity; Future; Expected date: 10/07/2024  -     Antithrombin III; Future; Expected date: 10/07/2024  -     Prothrombin gene mutation; Future; Expected date: 10/07/2024  -     Factor 5 leiden; Future; Expected date: 10/07/2024        Previous labs, records, and notes reviewed and considered for their impact on clinical decision making today.                Upcoming Scheduled Appointments and Follow Up:    Future Appointments   Date Time Provider Department Center   11/12/2024  8:00 AM Kisha Griffin MD [facility]       Follow Up [facility] (with who? when?): No follow-ups on file.      Extended Emergency Contact Information  Primary Emergency Contact: bridget moe  Mobile Phone: [phone]  Relation: Friend  Secondary Emergency Contact: Suzan Douglas  Mobile Phone: [phone]  Relation: Mother  Preferred language: English   needed? No      Shazia Ramos MD   Internal Medicine  10/7/2024 - 2:03 PM    I spent a total of 30 minutes on the day of the visit.This includes  face to face time and non-face to face time preparing to see the patient (eg, review of tests), obtaining and/or reviewing separately obtained history, documenting clinical information in the electronic or other health record, independently interpreting results and communicating results to the patient/family/caregiver, or care coordinator.    While patients have the right to access their medical record, it is essential to recognize that progress notes primarily serve as a means of communication among healthcare professionals.

## 2024-10-08 LAB
AT III ACT/NOR PPP CHRO: 111 % (ref 83–118)
FACT VIII ACT/NOR PPP: 240 % (ref 60–170)

## 2024-10-09 LAB
PROT C ACT/NOR PPP CHRO: 82 % (ref 70–150)
PROT S ACT/NOR PPP: 92 % (ref 50–150)

## 2024-10-10 LAB — 1,25(OH)2D3 SERPL-MCNC: 68 PG/ML (ref 20–79)

## 2024-10-11 LAB
F2 C.20210G>A GENO BLD/T: NEGATIVE
F5 P.R506Q BLD/T QL: NEGATIVE
FINAL PATHOLOGIC DIAGNOSIS: NORMAL
GROSS: NORMAL
LPA SERPL-MCNC: 32 MG/DL (ref 0–30)
Lab: NORMAL

## 2024-11-05 NOTE — PROGRESS NOTES
Neurology Clinic Note      Date: 11/6/24  Patient Name: Flakita Douglas   MRN: [id number]   PCP: Shazia Ramos  Referring Provider: Pepper Robison,Sreedhar    Assessment and Plan:   Flakita Douglas is a 44 y.o. female presenting for evaluation of frequent headaches and recurrent transient neurological symptoms.    The episodes do not seem consistent with a TIA or a cerebrovascular etiology.  Moreover, the fact that the symptoms, (especially the speech and weakness) take a week to resolve with the lack of any acute/chronic ischemia on MRI suggests an alternative etiology such as a complicated migraine.  Migraine with aura has been associated with a PFO.  Do not recommend closure of the PFO.  Reasonable to continue Plavix 75 mg daily.    Discussed a trial of Topamax 25 mg twice daily.  Reviewed common side effects.    RTC 2-3 months.    Problem List Items Addressed This Visit          Neuro    Migraine without aura and without status migrainosus, not intractable - Primary    Relevant Medications    topiramate (TOPAMAX) 25 MG tablet     Other Visit Diagnoses       Aphasia                  Subjective:          HPI:   Ms. Flakita Douglas is a 44 y.o. female presenting for evaluation of recurrent transient neurological symptoms.    First episode occurred in April 2022.  Seen at [facility].  She presented with difficulty speaking, generalized weakness, chest pain and soreness in her whole-body.  Per chart review, there was also mention of nausea and bizarre behavior.  MRI of the brain showed no acute infarct.  TTE was notable for a PFO.  She also underwent LP and CSF analysis which showed normal cell count, protein and glucose.  Remainder of her lab work was normal as well.  Since then, she has been experiencing frequent headaches which she describes as bifrontal, throbbing headaches associated with photophobia and phonophobia.  Occur ' every other day' with overall > 15 headache days a month.  In July 2024, she presented  again to [facility] with similar symptoms.  MRI of the brain showed no acute infarct.  CTA/CTP showed no ischemic core or penumbra.  She was diagnosed with a TIA and started on aspirin.  Also mentions right-sided weakness that happened with this episode.  Overall, her speech and weakness resolved over the course of a week.  The episode was also associated with worsening headache, photophobia and phonophobia.  In October, following a laparoscopic salpingectomy, she had another episode.  MRI showed no acute or chronic ischemia.  CTA was unchanged.  She also underwent an EEG which was normal.  Similar to the prior episode, her speech and strength resolved over the course of a week and she also experienced worsening headaches, photophobia and phonophobia.  A TTE showed a small PFO.    No further episodes since October.  She continues to have frequent headaches with photophobia and phonophobia.  Takes Tylenol although it hasn't been effective    Saw a cardiologist on 10/24 at [facility] who started her on Plavix and recommended closure of PFO.    She has a family history of a 4G4G mutation of LU-1.  Both her parents and her sister have this mutation.  She has never been tested and is due to see a hematologist at [facility] next week.        PAST MEDICAL HISTORY:  Past Medical History:   Diagnosis Date    Obesity, unspecified     PFO (patent foramen ovale)     Stroke-like symptoms     Hx of AMS & difficulty speaking,    TIA (transient ischemic attack)        PAST SURGICAL HISTORY:  Past Surgical History:   Procedure Laterality Date    LAPAROSCOPIC SALPINGECTOMY Bilateral 10/3/2024    Procedure: SALPINGECTOMY, LAPAROSCOPIC;  Surgeon: Lena Angela MD;  Location: [facility];  Service: OB/GYN;  Laterality: Bilateral;  RN PREOP 9/25/2024---T/S done----UPT NEG ON 10/1---NEED ORDERS    REMOVAL OF INTRAUTERINE DEVICE (IUD)  10/03/2024    REMOVAL OF INTRAUTERINE DEVICE (IUD) N/A 10/3/2024    Procedure: REMOVAL, INTRAUTERINE DEVICE;   "Surgeon: Lena Angela MD;  Location: [facility];  Service: OB/GYN;  Laterality: N/A;    SALPINGECTOMY Bilateral 10/03/2024       CURRENT MEDS:  Current Outpatient Medications   Medication Sig Dispense Refill    rimegepant (NURTEC) 75 mg odt Take 75 mg by mouth daily as needed.      topiramate (TOPAMAX) 25 MG tablet Take 1 tablet (25 mg total) by mouth 2 (two) times daily. 60 tablet 11     No current facility-administered medications for this visit.       ALLERGIES:  Review of patient's allergies indicates:   Allergen Reactions    Pcn [penicillins]        FAMILY HISTORY:  Family History   Problem Relation Name Age of Onset    Breast cancer Maternal Aunt      Breast cancer Maternal Grandmother         SOCIAL HISTORY:  Social History     Tobacco Use    Smoking status: Never    Smokeless tobacco: Never   Substance Use Topics    Alcohol use: Yes     Comment: socially    Drug use: Never       Review of Systems:  12 system review of systems is negative except for the symptoms mentioned in HPI.      Objective:     Vitals:    11/06/24 0847   BP: 122/84   BP Location: Right arm   Patient Position: Sitting   Pulse: 76   Weight: 90.7 kg (200 lb)   Height: 5' 6" (1.676 m)     General: NAD, well nourished   Eyes: no tearing, discharge, no erythema   Neck: Supple, full range of motion  Cardiovascular: Warm and well perfused  Lungs: Normal work of breathing  Skin: No rash, lesions, or breakdown on exposed skin  Psychiatry: Mood and affect are appropriate       NEUROLOGICAL EXAMINATION:     MENTAL STATUS   Oriented to person, place, and time.   Follows 2 step commands.   Speech: speech is normal   Level of consciousness: alert    CRANIAL NERVES     CN II   Visual fields full to confrontation.     CN III, IV, VI   Extraocular motions are normal.   Ophthalmoparesis: none    CN V   Facial sensation intact.     CN VII   Facial expression full, symmetric.     CN XI   CN XI normal.     CN XII   CN XII normal.     MOTOR EXAM "   Right arm pronator drift: absent  Left arm pronator drift: absent    Strength   Right deltoid: 5/5  Left deltoid: 5/5  Right biceps: 5/5  Left biceps: 5/5  Right triceps: 5/5  Left triceps: 5/5  Right wrist flexion: 5/5  Left wrist flexion: 5/5  Right wrist extension: 5/5  Left wrist extension: 5/5  Right interossei: 5/5  Left interossei: 5/5  Right iliopsoas: 5/5  Left iliopsoas: 5/5  Right quadriceps: 5/5  Left quadriceps: 5/5  Right hamstrin/5  Left hamstrin/5  Right glutei: 5/5  Left glutei: 5/5  Right anterior tibial: 5/5  Left anterior tibial: 5/5  Right gastroc: 5/5  Left gastroc: 5/5    REFLEXES     Reflexes   Right brachioradialis: 2+  Left brachioradialis: 2+  Right biceps: 2+  Left biceps: 2+  Right patellar: 2+  Left patellar: 2+  Right plantar: normal  Left plantar: normal    SENSORY EXAM   Light touch normal.     GAIT AND COORDINATION     Gait  Gait: normal     Coordination   Finger to nose coordination: normal    Tremor   Resting tremor: absent          Images:    Other Studies:          Ilia Serrano MD  Department of Neurology  [facility]

## 2024-11-06 ENCOUNTER — OFFICE VISIT (OUTPATIENT)
Dept: NEUROLOGY | Facility: CLINIC | Age: 44
End: 2024-11-06
Payer: COMMERCIAL

## 2024-11-06 VITALS
HEIGHT: 66 IN | HEART RATE: 76 BPM | SYSTOLIC BLOOD PRESSURE: 122 MMHG | DIASTOLIC BLOOD PRESSURE: 84 MMHG | WEIGHT: 200 LBS | BODY MASS INDEX: 32.14 KG/M2

## 2024-11-06 DIAGNOSIS — G43.E09 CHRONIC MIGRAINE WITH AURA WITHOUT STATUS MIGRAINOSUS, NOT INTRACTABLE: Primary | ICD-10-CM

## 2024-11-06 DIAGNOSIS — R47.01 APHASIA: ICD-10-CM

## 2024-11-06 PROBLEM — G43.009 MIGRAINE WITHOUT AURA AND WITHOUT STATUS MIGRAINOSUS, NOT INTRACTABLE: Status: ACTIVE | Noted: 2024-11-06

## 2024-11-06 PROCEDURE — 99999 PR PBB SHADOW E&M-EST. PATIENT-LVL III: CPT | Mod: PBBFAC,,, | Performed by: STUDENT IN AN ORGANIZED HEALTH CARE EDUCATION/TRAINING PROGRAM

## 2024-11-06 RX ORDER — TOPIRAMATE 25 MG/1
25 TABLET ORAL 2 TIMES DAILY
Qty: 60 TABLET | Refills: 11 | Status: SHIPPED | OUTPATIENT
Start: 2024-11-06 | End: 2025-11-06

## 2024-11-13 ENCOUNTER — OFFICE VISIT (OUTPATIENT)
Dept: OBSTETRICS AND GYNECOLOGY | Facility: CLINIC | Age: 44
End: 2024-11-13
Payer: COMMERCIAL

## 2024-11-13 VITALS
SYSTOLIC BLOOD PRESSURE: 126 MMHG | WEIGHT: 194.13 LBS | BODY MASS INDEX: 31.33 KG/M2 | DIASTOLIC BLOOD PRESSURE: 70 MMHG

## 2024-11-13 DIAGNOSIS — Z90.79 STATUS POST BILATERAL SALPINGECTOMY: Primary | ICD-10-CM

## 2024-11-13 PROCEDURE — 99024 POSTOP FOLLOW-UP VISIT: CPT | Mod: S$GLB,,, | Performed by: OBSTETRICS & GYNECOLOGY

## 2024-11-13 PROCEDURE — 3078F DIAST BP <80 MM HG: CPT | Mod: CPTII,S$GLB,, | Performed by: OBSTETRICS & GYNECOLOGY

## 2024-11-13 PROCEDURE — 3074F SYST BP LT 130 MM HG: CPT | Mod: CPTII,S$GLB,, | Performed by: OBSTETRICS & GYNECOLOGY

## 2024-11-13 PROCEDURE — 99999 PR PBB SHADOW E&M-EST. PATIENT-LVL III: CPT | Mod: PBBFAC,,, | Performed by: OBSTETRICS & GYNECOLOGY

## 2024-11-19 DIAGNOSIS — M51.369 DEGENERATION OF INTERVERTEBRAL DISC OF LUMBAR REGION, UNSPECIFIED WHETHER PAIN PRESENT: Primary | ICD-10-CM

## 2024-11-19 NOTE — PROGRESS NOTES
DATE: 11/19/2024  PATIENT: Flakita Douglas    Supervising Physician: Alan Harris M.D.    CHIEF COMPLAINT: low back pain    HISTORY:  Flakita Douglas is a 44 y.o. female here for initial evaluation of low back pain (Back - 7, Leg - 0).  The pain has been present for months, worsening after a recent salpingectomy. The patient describes the pain as aching.  The pain is worse with bending and improved by rest. There is negative associated numbness and tingling. There is negative subjective weakness. Prior treatments have included no PT, ESIs, surgery. Pt is also seeing neurology for evaluation of  recurrent transient neurological symptoms. She says she had a spinal tap in 2022 and was told it may cause chronic back pain in the future.    The patient denies myelopathic symptoms such as handwriting changes or difficulty with buttons/coins/keys. Denies perineal paresthesias, bowel/bladder dysfunction.    PAST MEDICAL/SURGICAL HISTORY:  Past Medical History:   Diagnosis Date    Obesity, unspecified     PFO (patent foramen ovale)     Stroke-like symptoms     Hx of AMS & difficulty speaking,    TIA (transient ischemic attack)      Past Surgical History:   Procedure Laterality Date    LAPAROSCOPIC SALPINGECTOMY Bilateral 10/3/2024    Procedure: SALPINGECTOMY, LAPAROSCOPIC;  Surgeon: Lena Angela MD;  Location: [facility] OR;  Service: OB/GYN;  Laterality: Bilateral;  RN PREOP 9/25/2024---T/S done----UPT NEG ON 10/1---NEED ORDERS    REMOVAL OF INTRAUTERINE DEVICE (IUD)  10/03/2024    REMOVAL OF INTRAUTERINE DEVICE (IUD) N/A 10/3/2024    Procedure: REMOVAL, INTRAUTERINE DEVICE;  Surgeon: Lena Angela MD;  Location: [facility] OR;  Service: OB/GYN;  Laterality: N/A;    SALPINGECTOMY Bilateral 10/03/2024       Medications:   Current Outpatient Medications on File Prior to Visit   Medication Sig Dispense Refill    rimegepant (NURTEC) 75 mg odt Take 75 mg by mouth daily as needed.      topiramate (TOPAMAX) 25 MG tablet  Take 1 tablet (25 mg total) by mouth 2 (two) times daily. 60 tablet 11     No current facility-administered medications on file prior to visit.       Social History:   Social History     Socioeconomic History    Marital status: Unknown   Tobacco Use    Smoking status: Never    Smokeless tobacco: Never   Substance and Sexual Activity    Alcohol use: Yes     Comment: socially    Drug use: Never    Sexual activity: Yes     Partners: Male     Social Drivers of Health     Financial Resource Strain: Patient Declined (10/17/2024)    Received from [facility]    Overall Financial Resource Strain (CARDIA)     Difficulty of Paying Living Expenses: Patient declined   Recent Concern: Financial Resource Strain - High Risk (10/3/2024)    Overall Financial Resource Strain (CARDIA)     Difficulty of Paying Living Expenses: Hard   Food Insecurity: Patient Declined (10/17/2024)    Received from [facility]    Hunger Vital Sign     Worried About Running Out of Food in the Last Year: Patient declined     Ran Out of Food in the Last Year: Patient declined   Transportation Needs: No Transportation Needs (10/17/2024)    Received from [facility]    PRAPARE - Transportation     Lack of Transportation (Medical): No     Lack of Transportation (Non-Medical): No   Physical Activity: Sufficiently Active (4/12/2022)    Received from [facility]    Exercise Vital Sign     Days of Exercise per Week: 7 days     Minutes of Exercise per Session: 60 min   Stress: Patient Unable To Answer (10/3/2024)    [employer] - Occupational Stress Questionnaire     Feeling of Stress : Patient unable to answer   Housing Stability: High Risk (10/3/2024)    Housing Stability Vital Sign     Unable to Pay for Housing in the Last Year: Yes     Homeless in the Last Year: No       REVIEW OF SYSTEMS:  Constitution: Negative. Negative for chills, fever and night sweats.   Cardiovascular: Negative for chest pain and syncope.   Respiratory:  Negative for cough and shortness of breath.   Gastrointestinal: See HPI. Negative for nausea/vomiting. Negative for abdominal pain.  Genitourinary: See HPI. Negative for discoloration or dysuria.  Skin: Negative for dry skin, itching and rash.   Hematologic/Lymphatic: Negative for bleeding problem. Does not bruise/bleed easily.   Musculoskeletal: Negative for falls and muscle weakness.   Neurological: See HPI. No seizures.   Endocrine: Negative for polydipsia, polyphagia and polyuria.   Allergic/Immunologic: Negative for hives and persistent infections.     EXAM:  There were no vitals taken for this visit.    General: The patient is a very pleasant 44 y.o. female in no apparent distress, the patient is oriented to person, place and time.  Psych: Normal mood and affect  HEENT: Vision grossly intact, hearing intact to the spoken word.  Lungs: Respirations unlabored.  Gait: Normal station and gait, no difficulty with toe or heel walk.   Skin: Dorsal lumbar skin negative for rashes, lesions, hairy patches and surgical scars. There is mild lumbar tenderness to palpation.  Range of motion: Lumbar range of motion is acceptable.  Spinal Balance: Global saggital and coronal spinal balance acceptable, not significant for scoliosis and kyphosis.  Musculoskeletal: No pain with the range of motion of the bilateral hips. No trochanteric tenderness to palpation.  Vascular: Bilateral lower extremities warm and well perfused, dorsalis pedis pulses 2+ bilaterally.  Neurological: Normal strength and tone in all major motor groups in the bilateral lower extremities. Normal sensation to light touch in the L2-S1 dermatomes bilaterally.  Deep tendon reflexes symmetric 2+ in the bilateral lower extremities.  Negative Babinski bilaterally. Straight leg raise negative bilaterally.    IMAGING:      Today I personally reviewed AP, Lat and Flex/Ex  upright L-spine films that demonstrate mild degenerative changes      There is no height or  weight on file to calculate BMI.    Hemoglobin A1C   Date Value Ref Range Status   04/13/2022 5.0 <5.7 % Final           ASSESSMENT/PLAN:    There are no diagnoses linked to this encounter.    Today we discussed at length all of the different treatment options including anti-inflammatories, acetaminophen, rest, ice, heat, physical therapy including strengthening and stretching exercises, home exercises, ROM, aerobic conditioning, aqua therapy, other modalities including ultrasound, massage, and dry needling, epidural steroid injections and finally surgical intervention.      Pt presents with chronic low back pain without radiculopathy. Will send tylenol and robaxin to pharmacy and PT orders to [facility]. Pt will fu if pain persists.

## 2024-11-20 ENCOUNTER — OFFICE VISIT (OUTPATIENT)
Dept: ORTHOPEDICS | Facility: CLINIC | Age: 44
End: 2024-11-20
Payer: COMMERCIAL

## 2024-11-20 ENCOUNTER — HOSPITAL ENCOUNTER (OUTPATIENT)
Dept: RADIOLOGY | Facility: HOSPITAL | Age: 44
Discharge: HOME | End: 2024-11-20
Attending: ORTHOPAEDIC SURGERY
Payer: COMMERCIAL

## 2024-11-20 VITALS — BODY MASS INDEX: 31.18 KG/M2 | WEIGHT: 194 LBS | HEIGHT: 66 IN

## 2024-11-20 DIAGNOSIS — M51.369 DEGENERATION OF INTERVERTEBRAL DISC OF LUMBAR REGION, UNSPECIFIED WHETHER PAIN PRESENT: Primary | ICD-10-CM

## 2024-11-20 DIAGNOSIS — M51.369 DEGENERATION OF INTERVERTEBRAL DISC OF LUMBAR REGION, UNSPECIFIED WHETHER PAIN PRESENT: ICD-10-CM

## 2024-11-20 PROCEDURE — 3008F BODY MASS INDEX DOCD: CPT | Mod: CPTII,S$GLB,, | Performed by: ORTHOPAEDIC SURGERY

## 2024-11-20 PROCEDURE — 1159F MED LIST DOCD IN RCRD: CPT | Mod: CPTII,S$GLB,, | Performed by: ORTHOPAEDIC SURGERY

## 2024-11-20 PROCEDURE — 72110 X-RAY EXAM L-2 SPINE 4/>VWS: CPT | Mod: TC

## 2024-11-20 PROCEDURE — 72110 X-RAY EXAM L-2 SPINE 4/>VWS: CPT | Mod: 26,,, | Performed by: RADIOLOGY

## 2024-11-20 PROCEDURE — 99999 PR PBB SHADOW E&M-EST. PATIENT-LVL II: CPT | Mod: PBBFAC,,, | Performed by: ORTHOPAEDIC SURGERY

## 2024-11-20 PROCEDURE — 99204 OFFICE O/P NEW MOD 45 MIN: CPT | Mod: S$GLB,,, | Performed by: ORTHOPAEDIC SURGERY

## 2024-11-20 RX ORDER — METHOCARBAMOL 750 MG/1
750 TABLET, FILM COATED ORAL NIGHTLY PRN
Qty: 90 TABLET | Refills: 0 | Status: SHIPPED | OUTPATIENT
Start: 2024-11-20 | End: 2025-02-18

## 2024-11-20 RX ORDER — DEXTROMETHORPHAN HYDROBROMIDE, GUAIFENESIN 5; 100 MG/5ML; MG/5ML
650 LIQUID ORAL EVERY 8 HOURS PRN
Qty: 90 TABLET | Refills: 0 | Status: SHIPPED | OUTPATIENT
Start: 2024-11-20

## 2024-12-28 NOTE — PROGRESS NOTES
History & Physical  Gynecology Problem Visit      SUBJECTIVE:     Chief Complaint: Post-op Evaluation       History of Present Illness:  Postoperative Follow-up  Ms. Douglas is a 43 y/o female who presents to the clinic 4 weeks status post bilateral salpingectomy for requested sterilization. Eating a regular diet without difficulty. Bowel movements are normal. The patient is not having any pain.      Review of patient's allergies indicates:   Allergen Reactions    Pcn [penicillins]        Past Medical History:   Diagnosis Date    Obesity, unspecified     PFO (patent foramen ovale)     Stroke-like symptoms     Hx of AMS & difficulty speaking,    TIA (transient ischemic attack)      Past Surgical History:   Procedure Laterality Date    LAPAROSCOPIC SALPINGECTOMY Bilateral 10/3/2024    Procedure: SALPINGECTOMY, LAPAROSCOPIC;  Surgeon: Lena Angela MD;  Location: [facility] OR;  Service: OB/GYN;  Laterality: Bilateral;  RN PREOP 2024---T/S done----UPT NEG ON 10/1---NEED ORDERS    REMOVAL OF INTRAUTERINE DEVICE (IUD)  10/03/2024    REMOVAL OF INTRAUTERINE DEVICE (IUD) N/A 10/3/2024    Procedure: REMOVAL, INTRAUTERINE DEVICE;  Surgeon: Lena Angela MD;  Location: [facility] OR;  Service: OB/GYN;  Laterality: N/A;    SALPINGECTOMY Bilateral 10/03/2024     OB History          2    Para   2    Term   2            AB        Living   2         SAB        IAB        Ectopic        Multiple        Live Births                   Family History   Problem Relation Name Age of Onset    Breast cancer Maternal Aunt      Breast cancer Maternal Grandmother       Social History     Tobacco Use    Smoking status: Never    Smokeless tobacco: Never   Substance Use Topics    Alcohol use: Yes     Comment: socially    Drug use: Never       Current Outpatient Medications   Medication Sig    acetaminophen (TYLENOL) 650 MG TbSR Take 1 tablet (650 mg total) by mouth every 8 (eight) hours as needed.    methocarbamoL  (ROBAXIN) 750 MG Tab Take 1 tablet (750 mg total) by mouth nightly as needed.    rimegepant (NURTEC) 75 mg odt Take 75 mg by mouth daily as needed. (Patient not taking: Reported on 11/20/2024)    topiramate (TOPAMAX) 25 MG tablet Take 1 tablet (25 mg total) by mouth 2 (two) times daily.     No current facility-administered medications for this visit.         Review of Systems:  Review of Systems   Constitutional:  Negative for activity change and fatigue.   HENT:  Negative for congestion, sneezing and sore throat.    Respiratory:  Negative for shortness of breath.    Cardiovascular:  Negative for chest pain.   Gastrointestinal:  Negative for abdominal pain, nausea and vomiting.   Genitourinary:  Negative for flank pain, menstrual problem, pelvic pain and vaginal discharge.   Musculoskeletal:  Negative for back pain.   Skin:  Negative for rash.   Neurological:  Negative for headaches.        OBJECTIVE:   Vitals:     Vitals:    11/13/24 0838   BP: 126/70   Weight: 88 kg (194 lb 1.8 oz)        Physical Exam:  Physical Exam  Vitals and nursing note reviewed.   Constitutional:       Appearance: Normal appearance. She is well-developed.   Cardiovascular:      Rate and Rhythm: Normal rate.   Pulmonary:      Effort: Pulmonary effort is normal. No respiratory distress.   Abdominal:      General: There is no distension.      Palpations: Abdomen is soft.      Tenderness: There is no abdominal tenderness.   Genitourinary:     Exam position: Supine.   Skin:     General: Skin is warm and dry.   Neurological:      Mental Status: She is oriented to person, place, and time.           ASSESSMENT:       ICD-10-CM ICD-9-CM    1. Status post bilateral salpingectomy  Z90.79 V45.77            Plan:      Flakita was seen today for post-op evaluation.    Diagnoses and all orders for this visit:    Status post bilateral salpingectomy  - Tolerating a regular diet with no nausea or vomiting.  - Voiding and ambulating with no issues  - Pain  controlled  - Doing well    No orders of the defined types were placed in this encounter.      Follow up in about 1 year (around 11/13/2025) for Well Woman/Annual.

## 2025-02-17 ENCOUNTER — OFFICE VISIT (OUTPATIENT)
Dept: NEUROLOGY | Facility: CLINIC | Age: 45
End: 2025-02-17
Payer: COMMERCIAL

## 2025-02-17 VITALS
BODY MASS INDEX: 33.2 KG/M2 | HEIGHT: 66 IN | DIASTOLIC BLOOD PRESSURE: 59 MMHG | SYSTOLIC BLOOD PRESSURE: 123 MMHG | WEIGHT: 206.56 LBS | HEART RATE: 67 BPM

## 2025-02-17 DIAGNOSIS — M54.9 DORSALGIA, UNSPECIFIED: Primary | ICD-10-CM

## 2025-02-17 DIAGNOSIS — M54.16 LUMBAR RADICULOPATHY, CHRONIC: ICD-10-CM

## 2025-02-17 RX ORDER — CLOPIDOGREL BISULFATE 75 MG/1
75 TABLET ORAL
COMMUNITY

## 2025-02-17 NOTE — PROGRESS NOTES
[facility] VISIT    Chief Complaint and Duration     Chief Complaint   Patient presents with    Back Pain     With unsteady feeling     for 5 months.    History of Present Illness     Flakita Douglas is a 44 y.o. right handed female with a history of multiple medical diagnoses as listed below that presents for pain with off balance unsteady feeling.     Patient has a self-referral    Significant past medical history of migraine, transient neurologic symptoms, PFO    Patient presents to clinic visit alone    Patient states between end of October and beginning of November of last year she began to have an exacerbation of low back pain and feeling off balance after standing for too long.  She endorses back pain does not radiate no focal neurologic deficits but can make her step back to try to catch her balance if she standing for a long period.  Duration of episodes lasts for seconds goes away on its own patient states she does not feel like she is dizzy, lightheaded or feel as though she is about to pass out during episodes.  She also denies any leg weakness, unilateral weakness, paresthesias or muscle cramping.  Symptoms wilber after seconds does not require patient to sit.  Current occupation she stands for long periods of time as a  she also reports being very active at work.  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  She reached out to orthopedics in regards to low back pain x-rays taken no acute fractures or subluxation patient was prescribed a muscle relaxer in which she endorses provides relief.  She also uses icy hot and a heating pad which provides additional relief.  Symptoms are most prominent in the middle of the day symptoms are not daily but can occur a couple of times a week.  She does not get any symptoms at rest no overt weakness.    For patient's history of migraines she has tried Nurtec from outside provider in which she  reports medication gave her side effects that were unpleasant such as feeling off she is currently managing her headaches/migraines with over-the-counter Tylenol she is aware that her trigger is sunlight.  Headaches have been less frequent in comparison to when she had initial neurological evaluation. She has been seen by multiple neurologists most recent visit provider prescribed topiramate patient reports he did not take.  She also is being followed by a [place] hematologist who has her on Plavix 75mg daily for episodes of transient symptoms.  Patient states she has not had any episodes since fall of last year.  She is also being followed by cardiologist for history of PFO.  She is currently taking Plavix 75 mg nightly she states this medication makes her sleepy.    Review of patient's allergies indicates:   Allergen Reactions    Pcn [penicillins]      Current Medications[1]    Medical History     Past Medical History:   Diagnosis Date    Obesity, unspecified     PFO (patent foramen ovale)     Stroke-like symptoms     Hx of AMS & difficulty speaking,    TIA (transient ischemic attack)      Past Surgical History:   Procedure Laterality Date    LAPAROSCOPIC SALPINGECTOMY Bilateral 10/3/2024    Procedure: SALPINGECTOMY, LAPAROSCOPIC;  Surgeon: Lena Angela MD;  Location: [facility] OR;  Service: OB/GYN;  Laterality: Bilateral;  RN PREOP 9/25/2024---T/S done----UPT NEG ON 10/1---NEED ORDERS    REMOVAL OF INTRAUTERINE DEVICE (IUD)  10/03/2024    REMOVAL OF INTRAUTERINE DEVICE (IUD) N/A 10/3/2024    Procedure: REMOVAL, INTRAUTERINE DEVICE;  Surgeon: Lena Angela MD;  Location: [facility] OR;  Service: OB/GYN;  Laterality: N/A;    SALPINGECTOMY Bilateral 10/03/2024     Family History   Problem Relation Name Age of Onset    Breast cancer Maternal Aunt      Breast cancer Maternal Grandmother       Social History[2]    Exam     Vitals:    02/17/25 1135   BP: (!) 123/59   Pulse: 67      Physical Exam:  General:  Not in acute distress. Not ill-appearing.   HENT: Normocephalic and atraumatic. Moist mucous membranes.  Eyes: Conjunctivae normal.   Pulmonary: Pulmonary effort is normal.   Skin: Skin is warm and dry. No rashes.   Psychiatric: Mood normal.        Neurologic Exam   Mental status: oriented to person, place, and time  Attention: Normal. Concentration: normal.  Speech: speech is normal.  Cranial Nerves: EOMI intact, V1-V3 Facial sensation intact. Symmetric facies. Hearing grossly intact. Palate and uvula midline, symmetric. No tongue deviation. Trapezius strength intact.     Motor exam: bulk and tone normal. Strength 5/5 in bilateral upper extremities: deltoids, biceps, triceps, wrist flexion/extension, finger abduction/adduction. Strength 5/5 in bilateral lower extremities: hip flexion/extension, thigh adduction/abduction, knee flexion/extension, dorsiflexion/plantarflexion, foot eversion/inversion.    Reflexes: 2+ in bilateral upper extremities: biceps and brachiaradialis, 2+ in bilateral lower extremities: patellar and achilles  Plantar reflex: normal  Gomez's/Clonus: negative    Sensory exam: pin prick and light touch intact    Gait exam: normal, tandem normal, heel walk elicited low back pain  Romberg: negative  Coordination: normal    Tremor: none  Cogwheel rigidity: none    Labs and Imaging     Labs: reviewed  No results found for this or any previous visit (from the past 24 hours).    Thyroid normal  LDL:  107.4  Magnesium: 2.0    Imaging:   I have personally reviewed the images performed.     HEAD CTA Head & Neck 10/3/24:  No definite acute intracranial or evidence of acute large vessel occlusion or flow-limiting stenosis.    BRAIN MRI W/O contrast 10/03/24:  No mass lesion, acute hemorrhage, edema or acute infarct     Xray Lumbar Spine AP Lateral w/Flex Ext 11/20/24  Mild narrowing of disc spaces between L4 and S1 vertebral segments. No fracture, spondylolisthesis or bone destruction identified     Other  procedures: reviewed    Assessment and Plan     Problem List Items Addressed This Visit    None  Visit Diagnoses         Dorsalgia, unspecified    -  Primary    Relevant Orders    MRI Lumbar Spine Without Contrast      Lumbar radiculopathy, chronic        Relevant Orders    MRI Lumbar Spine Without Contrast          Ms. Douglas is a 44 year old female new to me who presents for evaluation and recommendations of LBP with unsteady feeling.  Scented 5+ months ago.  She also endorses low back pain with unsteadiness on her feet.  Symptoms are provoked from standing too long.  Patient states if she stands too long she can get unsteady and have to take a step back to catch her balance.  No radicular features.  No focal neurologic deficits on exam. Duration of episodes lasts for seconds goes away on its own patient states she does not feel like she is dizzy, lightheaded or feel as though she is about to pass out during episodes.  She also denies any leg weakness, unilateral weakness, paresthesias or muscle cramping.  Symptoms do not require patient to have to sit.  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  X-ray lumbar spine with mild narrowing L4 and S1.  She was evaluated by Orthopedics who prescribed muscle relaxer, she also uses icy hot and heating pad reports relief from these symptoms.  Patient denies any weakness, limited range of motion or inability to use extremities when symptoms are present.  She does have a history of migraines managing with Tylenol and trigger control.  She has been seen by multiple neurologists for migraines and episodes of transient symptoms.  Being followed by Cardiology as patient has small PFO and 2 radames hematologist in which she is taking Plavix 75 mg daily.  Physical examination pain elicited during heel walk tandem and Romberg negative.  Remaining physical examination unremarkable.     Given patient's onset presentation provoking  factors my plan is as follows.  I will obtain MRI lumbar spine imaging.  With the patient has occupational hazard highly suspicious of a MSK contributing factor nevertheless we will order MRI lumbar spine without contrast.  Also discussed with the patient conservative management of physical therapy.  Patient can continue conservative management with topical rubs, heating pad and prescribed muscle relaxers from Orthopedics.  Discussed with the patient red flag symptoms regarding physical limitations in mobility requiring activation of the EMS system or reporting to the emergency room.  Also discussed MRI brain imaging CTA head and neck findings noncontributory to patient's reported symptoms on today.  Discuss with her occupational hazard presented on a daily basis as contributing factor to symptoms reported on today.  Otherwise lifestyle modifications discussed to include sleep hygiene, exercise, dietary intake.  Patient will continue to follow up with Cardiology and Hematology at outside facility.     Questions and concerns were sought and answered to the patient's stated verbal satisfaction. The patient verbalizes understanding and agreement with the above stated treatment plan.      Thank you for allowing me to assist in Mrs. Flakita Douglas 's care. If you have any questions, please contact clinic @ [phone] or use of portal messaging services via electronic medical record.    Anat Manzanares NP-C  [facility]  Department of Neurology- [facility]     [phone]    Follow-up: Follow up in about 3 weeks (around 3/10/2025).  For MRI results    Time spent on this encounter: 60 minutes. This includes face to face time and non-face to face time preparing to see the patient (eg, review of tests), obtaining and/or reviewing separately obtained history, documenting clinical information in the electronic or other health record, independently interpreting results and communicating results to the  patient/family/caregiver, or care coordinator.     This note was created by combination of typed  and M-Modal dictation. Transcription and phonetic errors may be present.  If there are any questions, please contact me.         [1]   Current Outpatient Medications   Medication Sig Dispense Refill    acetaminophen (TYLENOL) 650 MG TbSR Take 1 tablet (650 mg total) by mouth every 8 (eight) hours as needed. 90 tablet 0    methocarbamoL (ROBAXIN) 750 MG Tab Take 1 tablet (750 mg total) by mouth nightly as needed. 90 tablet 0    clopidogreL (PLAVIX) 75 mg tablet Take 75 mg by mouth.      rimegepant (NURTEC) 75 mg odt Take 75 mg by mouth daily as needed. (Patient not taking: Reported on 2/17/2025)      topiramate (TOPAMAX) 25 MG tablet Take 1 tablet (25 mg total) by mouth 2 (two) times daily. (Patient not taking: Reported on 2/17/2025) 60 tablet 11     No current facility-administered medications for this visit.   [2]   Social History  Socioeconomic History    Marital status: Unknown   Tobacco Use    Smoking status: Never    Smokeless tobacco: Never   Substance and Sexual Activity    Alcohol use: Yes     Comment: socially    Drug use: Never    Sexual activity: Yes     Partners: Male     Social Drivers of Health     Financial Resource Strain: Patient Declined (10/17/2024)    Received from [email or web address]    Overall Financial Resource Strain (CARDIA)     Difficulty of Paying Living Expenses: Patient declined   Recent Concern: Financial Resource Strain - High Risk (10/3/2024)    Overall Financial Resource Strain (CARDIA)     Difficulty of Paying Living Expenses: Hard   Food Insecurity: Patient Declined (10/17/2024)    Received from [email or web address]    Hunger Vital Sign     Worried About Running Out of Food in the Last Year: Patient declined     Ran Out of Food in the Last Year: Patient declined   Transportation Needs: No Transportation Needs (10/17/2024)    Received from [facility]    PRAPARE - Transportation     Lack of  Transportation (Medical): No     Lack of Transportation (Non-Medical): No   Physical Activity: Sufficiently Active (4/12/2022)    Received from [facility]    Exercise Vital Sign     Days of Exercise per Week: 7 days     Minutes of Exercise per Session: 60 min   Stress: Patient Unable To Answer (10/3/2024)    [employer] - Occupational Stress Questionnaire     Feeling of Stress : Patient unable to answer   Housing Stability: High Risk (10/3/2024)    Housing Stability Vital Sign     Unable to Pay for Housing in the Last Year: Yes     Homeless in the Last Year: No

## 2025-04-07 ENCOUNTER — TELEPHONE (OUTPATIENT)
Dept: NEUROLOGY | Facility: CLINIC | Age: 45
End: 2025-04-07
Payer: MEDICAID

## 2025-04-07 NOTE — TELEPHONE ENCOUNTER
Spoke w/pt appt scheduled on 04/23/25 for mri l-spine and 05/05/25 for a f/u                 ----- Message from Lisa sent at 4/7/2025  8:13 AM CDT -----  Regarding: self  Who called: selfWhat is the request in detail: pt is trying to rescheduled hos f/u appt but insurance changed to medicaid and book it will not populate apptsCan the clinic reply by [facility]? NoWould the patient rather a call back or a response via [facility]? Call [facility] call back number: [phone] Information:Thank you.

## 2025-04-17 NOTE — PROGRESS NOTES
"DATE: 4/23/2025  PATIENT: Flakita Douglas    Attending Physician: Alan Harris M.D.    HISTORY:  Flakita Douglas is a 44 y.o. female who returns to me today for follow up.  She was last seen by me 11/20/2024.  Today she is doing well but notes she continues to have low back pain. She has been doing home exercises and is scheduled for an MRI later today    The Patient denies myelopathic symptoms such as handwriting changes or difficulty with buttons/coins/keys. Denies perineal paresthesias, bowel/bladder dysfunction.      EXAM:  Ht 5' 6" (1.676 m)   Wt 93.7 kg (206 lb 9.1 oz)   BMI 33.34 kg/m²     My physical examination was notable for the following findings:     Musculoskeletal and neuro exam stable      IMAGING:    Today I personally re- reviewed AP, Lat and Flex/Ex  upright L-spine that demonstrate mild degenerative changes     Body mass index is 33.34 kg/m².    Hemoglobin A1C   Date Value Ref Range Status   04/13/2022 5.0 <5.7 % Final         ASSESSMENT/PLAN:    Flakita was seen today for low-back pain.    Diagnoses and all orders for this visit:    Degeneration of intervertebral disc of lumbar region, unspecified whether pain present  -     Ambulatory Referral/Consult to Physical Therapy      Today we discussed at length all of the different treatment options including anti-inflammatories, acetaminophen, rest, ice, heat, physical therapy including strengthening and stretching exercises, home exercises, ROM, aerobic conditioning, aqua therapy, other modalities including ultrasound, massage, and dry needling, epidural steroid injections and finally surgical intervention.      Pt presents with chronic low back pain. Failure of conservative rx. Will review MRI when done and consider KARO with IR    "

## 2025-04-23 ENCOUNTER — HOSPITAL ENCOUNTER (OUTPATIENT)
Dept: RADIOLOGY | Facility: HOSPITAL | Age: 45
Discharge: HOME | End: 2025-04-23
Payer: MEDICAID

## 2025-04-23 ENCOUNTER — OFFICE VISIT (OUTPATIENT)
Dept: ORTHOPEDICS | Facility: CLINIC | Age: 45
End: 2025-04-23
Payer: MEDICAID

## 2025-04-23 VITALS — HEIGHT: 66 IN | BODY MASS INDEX: 33.2 KG/M2 | WEIGHT: 206.56 LBS

## 2025-04-23 DIAGNOSIS — M54.16 LUMBAR RADICULOPATHY, CHRONIC: ICD-10-CM

## 2025-04-23 DIAGNOSIS — M51.369 DEGENERATION OF INTERVERTEBRAL DISC OF LUMBAR REGION, UNSPECIFIED WHETHER PAIN PRESENT: Primary | ICD-10-CM

## 2025-04-23 DIAGNOSIS — M54.9 DORSALGIA, UNSPECIFIED: ICD-10-CM

## 2025-04-23 PROCEDURE — 99999 PR PBB SHADOW E&M-EST. PATIENT-LVL III: CPT | Mod: PBBFAC,,, | Performed by: ORTHOPAEDIC SURGERY

## 2025-04-23 PROCEDURE — 72148 MRI LUMBAR SPINE W/O DYE: CPT | Mod: TC

## 2025-04-23 PROCEDURE — 1159F MED LIST DOCD IN RCRD: CPT | Mod: CPTII,,, | Performed by: ORTHOPAEDIC SURGERY

## 2025-04-23 PROCEDURE — 72148 MRI LUMBAR SPINE W/O DYE: CPT | Mod: 26,,, | Performed by: INTERNAL MEDICINE

## 2025-04-23 PROCEDURE — 99213 OFFICE O/P EST LOW 20 MIN: CPT | Mod: PBBFAC | Performed by: ORTHOPAEDIC SURGERY

## 2025-04-23 PROCEDURE — 3008F BODY MASS INDEX DOCD: CPT | Mod: CPTII,,, | Performed by: ORTHOPAEDIC SURGERY

## 2025-04-23 PROCEDURE — 99213 OFFICE O/P EST LOW 20 MIN: CPT | Mod: S$PBB,,, | Performed by: ORTHOPAEDIC SURGERY

## 2025-05-28 PROBLEM — M53.86 DECREASED RANGE OF MOTION OF LUMBAR SPINE: Status: ACTIVE | Noted: 2025-05-28

## 2025-05-28 PROBLEM — M62.81 WEAKNESS OF TRUNK MUSCULATURE: Status: ACTIVE | Noted: 2025-05-28

## 2025-05-28 PROBLEM — M51.369 DEGENERATIVE DISC DISEASE, LUMBAR: Status: ACTIVE | Noted: 2025-05-28

## 2025-05-29 ENCOUNTER — CLINICAL SUPPORT (OUTPATIENT)
Dept: REHABILITATION | Facility: OTHER | Age: 45
End: 2025-05-29
Attending: ORTHOPAEDIC SURGERY
Payer: MEDICAID

## 2025-05-29 DIAGNOSIS — G89.29 CHRONIC MIDLINE LOW BACK PAIN WITHOUT SCIATICA: Primary | ICD-10-CM

## 2025-05-29 DIAGNOSIS — M54.50 CHRONIC MIDLINE LOW BACK PAIN WITHOUT SCIATICA: Primary | ICD-10-CM

## 2025-05-29 DIAGNOSIS — M25.60 DECREASED RANGE OF MOTION: ICD-10-CM

## 2025-05-29 DIAGNOSIS — R53.1 DECREASED STRENGTH: ICD-10-CM

## 2025-05-29 PROCEDURE — 97140 MANUAL THERAPY 1/> REGIONS: CPT | Mod: PN | Performed by: PHYSICAL THERAPIST

## 2025-05-29 PROCEDURE — 97161 PT EVAL LOW COMPLEX 20 MIN: CPT | Mod: PN | Performed by: PHYSICAL THERAPIST

## 2025-05-29 NOTE — PROGRESS NOTES
Outpatient Rehab    Physical Therapy Evaluation    Patient Name: Flakita Douglas  MRN: [id number]  YOB: 1980  Encounter Date: 5/29/2025    Therapy Diagnosis:   Encounter Diagnoses   Name Primary?    Chronic midline low back pain without sciatica Yes    Decreased range of motion     Decreased strength        Physician: Kasie Vaca,*    Physician Orders: Eval and Treat  Medical Diagnosis: Degeneration of intervertebral disc of lumbar region, unspecified whether pain present    Visit # / Visits Authorized:  1 / 1  Insurance Authorization Period: 4/23/2025 to 4/23/2026  Date of Evaluation: 5/29/2025  Plan of Care Certification: 5/29/2025 to 7/24/2025     Time In: 0902   Time Out: 0947  Total Time (in minutes): 45   Total Billable Time (in minutes): 45    Intake Outcome Measure for FOTO Survey    Therapist reviewed FOTO scores for Flakita Douglas on 5/29/2025.   FOTO report - see Media section or FOTO account episode details.     Intake Score: 62%  Predicted score: 66%         Subjective   History of Present Illness  Flakita is a 44 y.o. female      The patient reports a medical diagnosis of Degeneration of intervertebral disc of lumbar region, unspecified whether pain present.    Diagnostic tests related to this condition: MRI studies.   MRI Studies Details: Impression:     Degenerative changes at L4-5 and L5-S1 as detailed above.        Electronically signed by:Cruz Deshpande    History of Present Condition/Illness: She reports back pain. In her previous job she had a lot of physical work. Heavy lifting. She had a possible TIA or seizure in July and during that episode she felt like she was in a bad car accident. She has had a couple of these episodes but this last one felt worse with her body. She was unable to walk after this for about 3 days. She couldn't get in or out of the tub or toilet without help. Since then her back has been bad enough she has to sleep with a heating pad. It has improved  a little where she can get out of bed gradually. The most pain is in the morning and then throughout the day it will get better. If an episode would to happen while she is PT, she would like emergency services called. Pain starts at her bra and goes down the entire back. Denies anything into her legs. Denies numbness and tingling. Denies fevers or recent infection. Pain is worse with bending over and sitting in high bar stool chairs. She tries not to take pain meds because when she takes that with her other meds, she feels off. She just recently got a new job at [employer] and will have to be doing a lot of standing, bending, and lifting.     Activities of Daily Living  Social history was obtained from Patient.               Previously independent with activities of daily living? No     Currently independent with activities of daily living? Yes     Required assistance with bathroom activities previously.         Pain     Patient reports a current pain level of 4/10. Pain at best is reported as 4/10. Pain at worst is reported as 8/10.   Location: Midline from the TL junction to sacrum  Clinical Progression (since onset): Improved  Pain Qualities: Aching, Tightness  Pain-Relieving Factors: Heat, Medications - over-the-counter  Pain-Aggravating Factors: Bending, Lifting, Lying down, Sitting         Review of Systems  Patient reports: Sleep Disturbance and Cardiac History  Patient denies: Bladder Incontinence, Bowel Incontinence, Fever, Night Sweats/Chills, Night Pain, Weight Gain, Weight Loss, Cancer History, Diabetes, Recent Infection History, and Rheumatoid Arthritis        Treatment History  Treatments  Previously Received Treatments: Yes  Previous Treatments: Medications - over-the-counter, Heat    Living Arrangements  Living Situation  Housing: Home independently  Living Arrangements: Family members        Employment  Patient reports: Does the patient's condition impact their ability to work?  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      Past Medical History/Physical Systems Review:   Flakita Douglas  has a past medical history of Obesity, unspecified, PFO (patent foramen ovale), Stroke-like symptoms, and TIA (transient ischemic attack).    Flakita Douglas  has a past surgical history that includes Removal of intrauterine device (IUD) (10/03/2024); Salpingectomy (Bilateral, 10/03/2024); Laparoscopic salpingectomy (Bilateral, 10/3/2024); and Removal of intrauterine device (IUD) (N/A, 10/3/2024).    Flakita has a current medication list which includes the following prescription(s): acetaminophen, clopidogrel, nurtec, and topiramate.    Review of patient's allergies indicates:   Allergen Reactions    Pcn [penicillins]         Objective   Posture                 Increased lumbar lordosis, B shoulder depression, stands with B hip ER    Right Dermatomes  Right Lumbar Dermatome Light Touch  Intact: L1, L2, L3, L4, and L5  Right Sacral Dermatome Light Touch  Intact: S1-2       Left Dermatomes  Left Lumbar Dermatome Light Touch  Intact: L1, L2, L3, L4, and L5  Left Sacral Dermatome Light Touch  Intact: S1-2           Right Lower Extremity Reflexes  Patellar, L4: Normal (2+)         Achilles, S1: Normal (2+)         Left Lower Extremity Reflexes  Patellar, L4: Normal (2+)          Achilles, S1: Normal (2+)             Spinal Mobility  Thoracic Mobility Details: Hypomobility noted at TL junction          Hip Range of Motion   Right Hip   Active (deg) Passive (deg) Pain   Flexion   90 Yes   Extension   5 Yes   ABduction         ADduction         External Rotation 90/90   50 Yes   External Rotation Prone         Internal Rotation 90/90   35 Yes   Internal Rotation Prone             Left Hip   Active (deg) Passive (deg) Pain   Flexion   90 Yes   Extension   5 Yes   ABduction         ADduction         External Rotation 90/90   50 Yes   External Rotation Prone          Internal Rotation 90/90   30 Yes   Internal Rotation Prone             All pain was in the midline of the low back               Hip Strength - Planes of Motion   Right Strength Right Pain Left Strength Left  Pain   Flexion (L2) 3   3     Extension 3+   3+     ABduction           ADduction           Internal Rotation 4   4     External Rotation 3+   3+             Lumbar/Pelvic Girdle Special Tests  Lumbar Tests - Repeated  Negative: Flexion and Extension       Lumbar Tests - SLR and Tension  Negative: Right Passive Straight Leg Raise, Left Passive Straight Leg Raise, Right Crossed Straight Leg Raise, and Left Crossed Straight Leg Raise       Other Lumbar Tests  Positive: Lumbar Vertical Compression            Pelvic Girdle / Sacrum Tests  Positive: Right FADIR and Left FADIR  Negative: Right JEN, Left JEN, and Right Sacral Spring  Supine to long sit - Left short to short       Hip Special Tests  Intra-Articular/Impingement Tests  Positive: Right FADIR and Left FADIR  Negative: Right JEN and Left JEN              Squat Testing        Fearful of pain and performed minimal squat. Unable to fully see hip motion due to lack of depth of squat.     Single Leg Squat Testing - Right Leg              Unable to attempt, fearful    Single Leg Squat Testing - Left Leg              Unable to attempt, fearful       Gait Analysis  Gait Analysis Details  Right rotation extension, B hip drop. B hip ER Right>Left        Lumbar AROM Pain/Dysfunction with Movement   Flexion 75% limited Lack of reversal of lumbar curvature   Extension 50% limited Pain midline   Right side bending 25% limited Midline pain   Left side bending 25% limited Midline pain   Right rotation 25% limited No pain   Left rotation 25% limited No pain        Treatment:  Manual Therapy  MT 1: HVLAT - TL junction grade 5 mobilization  MT 2: pt education      Time Entry(in minutes):  PT Evaluation (Low) Time Entry: 30  Manual Therapy Time Entry:  15    Assessment & Plan   Assessment  Flakita presents with a condition of Low complexity.   Presentation of Symptoms: Stable  Will Comorbidities Impact Care: No       Functional Limitations: Activity tolerance, Completing work/school activities, Functional mobility, Gait limitations, Range of motion, Standing tolerance, Squatting  Impairments: Abnormal gait, Abnormal or restricted range of motion, Impaired physical strength, Pain with functional activity  Personal Factors Affecting Prognosis: Fear/anxiety, Pain    Patient Goal for Therapy (PT): She would like to be able to do her job and get out of bed without pain.  Prognosis: Fair  Assessment Details: Flakita presents to PT today with primary complaints of midline low back pain. Based on evaluation suspect movement coordination impairment as primary cause of pain. She has gross hip and core weakness as well and high levels of fear avoidance. Joint mobility deficits found at mid to low thoracic and TL junction regions. Further evaluation would be beneficial as irritability subsides. She would benefit from continued skilled PT to address dysfunction listed above in order to improve pain, function, and quality of life.     Plan  From a physical therapy perspective, the patient would benefit from: Skilled Rehab Services    Planned therapy interventions include: Therapeutic exercise, Therapeutic activities, Neuromuscular re-education, Manual therapy, ADLs/IADLs, and Gait training.    Planned modalities to include: Biofeedback, Electrical stimulation - attended, and Electrical stimulation - passive/unattended.        Visit Frequency: 2 times Per Week for 8 Weeks.       This plan was discussed with Patient.   Discussion participants: Agreed Upon Plan of Care             The patient's spiritual, cultural, and educational needs were considered, and the patient is agreeable to the plan of care and goals.     Education  Education was done with Patient.           - pt  education on HEP, PLAN OF CARE, and signs and symptoms as they relate to current dysfunction       Goals:   Active       LTG       Pt will be independent with final HEP in order to DC to home program.         Start:  05/29/25    Expected End:  07/24/25            Pt will improve FOTO to 70% indicative of overall improvement in function.        Start:  05/29/25    Expected End:  07/24/25            Pt will reduce worst pain to 1/10 in order to improve functional mobility         Start:  05/29/25    Expected End:  07/24/25            Pt will improve lumbar flexion range of motion by 25% in order to improve functional mobility         Start:  05/29/25    Expected End:  07/24/25            pt will improve Hip ER strength to 4/5 in order to improve functional mechanics.        Start:  05/29/25    Expected End:  07/24/25                Lucy Ray, PT, DPT

## 2025-06-18 DIAGNOSIS — G43.E09 CHRONIC MIGRAINE WITH AURA WITHOUT STATUS MIGRAINOSUS, NOT INTRACTABLE: ICD-10-CM

## 2025-06-18 RX ORDER — TOPIRAMATE 25 MG/1
25 TABLET, FILM COATED ORAL 2 TIMES DAILY
Qty: 60 TABLET | Refills: 11 | Status: SHIPPED | OUTPATIENT
Start: 2025-06-18 | End: 2026-06-18